# Patient Record
Sex: FEMALE | Race: ASIAN | NOT HISPANIC OR LATINO | Employment: UNEMPLOYED | URBAN - METROPOLITAN AREA
[De-identification: names, ages, dates, MRNs, and addresses within clinical notes are randomized per-mention and may not be internally consistent; named-entity substitution may affect disease eponyms.]

---
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
## 2025-01-07 ENCOUNTER — PRENATAL OFFICE VISIT (OUTPATIENT)
Dept: FAMILY MEDICINE | Facility: CLINIC | Age: 20
End: 2025-01-07
Payer: COMMERCIAL

## 2025-01-07 VITALS
RESPIRATION RATE: 22 BRPM | BODY MASS INDEX: 31.93 KG/M2 | OXYGEN SATURATION: 100 % | HEIGHT: 57 IN | SYSTOLIC BLOOD PRESSURE: 127 MMHG | DIASTOLIC BLOOD PRESSURE: 84 MMHG | TEMPERATURE: 97.7 F | HEART RATE: 105 BPM | WEIGHT: 148 LBS

## 2025-01-07 DIAGNOSIS — I10 CHRONIC HYPERTENSION: ICD-10-CM

## 2025-01-07 DIAGNOSIS — O09.90 SUPERVISION OF HIGH RISK PREGNANCY, ANTEPARTUM: Primary | ICD-10-CM

## 2025-01-07 PROCEDURE — 99207 PR PRENATAL VISIT: CPT | Performed by: FAMILY MEDICINE

## 2025-01-07 PROCEDURE — 91320 SARSCV2 VAC 30MCG TRS-SUC IM: CPT | Performed by: FAMILY MEDICINE

## 2025-01-07 PROCEDURE — 90480 ADMN SARSCOV2 VAC 1/ONLY CMP: CPT | Performed by: FAMILY MEDICINE

## 2025-01-07 PROCEDURE — 81003 URINALYSIS AUTO W/O SCOPE: CPT | Performed by: FAMILY MEDICINE

## 2025-01-07 NOTE — PROGRESS NOTES
Feeling well. Taking her medication as directed.   No dizziness or chest pain. No leg swelling.   No bleeding, pelvic pain.   Needs fetal survey. [facility] referral placed for chronic hypertension, fetal survey.   Covid vaccine today.

## 2025-01-08 ENCOUNTER — TRANSCRIBE ORDERS (OUTPATIENT)
Dept: MATERNAL FETAL MEDICINE | Facility: HOSPITAL | Age: 20
End: 2025-01-08
Payer: COMMERCIAL

## 2025-01-08 DIAGNOSIS — O26.90 PREGNANCY RELATED CONDITION, ANTEPARTUM: Primary | ICD-10-CM

## 2025-01-16 ENCOUNTER — PRE VISIT (OUTPATIENT)
Dept: MATERNAL FETAL MEDICINE | Facility: HOSPITAL | Age: 20
End: 2025-01-16
Payer: COMMERCIAL

## 2025-01-20 ENCOUNTER — ANCILLARY PROCEDURE (OUTPATIENT)
Dept: ULTRASOUND IMAGING | Facility: HOSPITAL | Age: 20
End: 2025-01-20
Attending: STUDENT IN AN ORGANIZED HEALTH CARE EDUCATION/TRAINING PROGRAM
Payer: COMMERCIAL

## 2025-01-20 ENCOUNTER — OFFICE VISIT (OUTPATIENT)
Dept: MATERNAL FETAL MEDICINE | Facility: HOSPITAL | Age: 20
End: 2025-01-20
Attending: STUDENT IN AN ORGANIZED HEALTH CARE EDUCATION/TRAINING PROGRAM
Payer: COMMERCIAL

## 2025-01-20 DIAGNOSIS — O35.EXX0 PYELECTASIS OF FETUS ON PRENATAL ULTRASOUND: Primary | ICD-10-CM

## 2025-01-20 DIAGNOSIS — O10.919 CHRONIC HYPERTENSION IN PREGNANCY: ICD-10-CM

## 2025-01-20 DIAGNOSIS — O26.90 PREGNANCY RELATED CONDITION, ANTEPARTUM: ICD-10-CM

## 2025-01-20 DIAGNOSIS — O99.210 OBESITY DURING PREGNANCY: ICD-10-CM

## 2025-01-20 PROCEDURE — 76811 OB US DETAILED SNGL FETUS: CPT | Mod: 26 | Performed by: STUDENT IN AN ORGANIZED HEALTH CARE EDUCATION/TRAINING PROGRAM

## 2025-01-20 PROCEDURE — 99213 OFFICE O/P EST LOW 20 MIN: CPT | Mod: 25 | Performed by: STUDENT IN AN ORGANIZED HEALTH CARE EDUCATION/TRAINING PROGRAM

## 2025-01-20 PROCEDURE — 76811 OB US DETAILED SNGL FETUS: CPT

## 2025-01-20 NOTE — PROGRESS NOTES
The patient was seen for an ultrasound in the [facility] today.  For a detailed report of the ultrasound examination, please see the ultrasound report which can be found under the imaging tab.    If you have questions regarding today's evaluation or if we can be of further service, please contact the [facility].    Katherine Marinelli MD  , OB/GYN  Maternal-Fetal Medicine

## 2025-01-20 NOTE — NURSING NOTE
Ashley Cross is a  at 24w4d who presents to [facility] for a comprehensive ultrasound. Pt reports positive fetal movement. Pt denies bldg/lof/change in discharge, contractions, headache, vision changes, chest pain/SOB or edema. SBAR given to Dr. Marinelli, see note in Epic.      Bhakti Kate RN

## 2025-02-05 ENCOUNTER — PRENATAL OFFICE VISIT (OUTPATIENT)
Dept: FAMILY MEDICINE | Facility: CLINIC | Age: 20
End: 2025-02-05
Payer: COMMERCIAL

## 2025-02-05 VITALS
SYSTOLIC BLOOD PRESSURE: 117 MMHG | WEIGHT: 154 LBS | DIASTOLIC BLOOD PRESSURE: 82 MMHG | OXYGEN SATURATION: 100 % | HEART RATE: 120 BPM | RESPIRATION RATE: 20 BRPM | BODY MASS INDEX: 33.22 KG/M2 | HEIGHT: 57 IN | TEMPERATURE: 97.8 F

## 2025-02-05 DIAGNOSIS — O09.90 SUPERVISION OF HIGH RISK PREGNANCY, ANTEPARTUM: Primary | ICD-10-CM

## 2025-02-05 DIAGNOSIS — I10 CHRONIC HYPERTENSION: ICD-10-CM

## 2025-02-05 LAB
ALBUMIN UR-MCNC: NEGATIVE MG/DL
GLUCOSE 1H P 50 G GLC PO SERPL-MCNC: 138 MG/DL (ref 70–129)
GLUCOSE UR STRIP-MCNC: NEGATIVE MG/DL
HGB BLD-MCNC: 11.8 G/DL (ref 11.7–15.7)
KETONES UR STRIP-MCNC: ABNORMAL MG/DL
T PALLIDUM AB SER QL: NONREACTIVE

## 2025-02-05 PROCEDURE — 81003 URINALYSIS AUTO W/O SCOPE: CPT | Performed by: FAMILY MEDICINE

## 2025-02-05 PROCEDURE — 99207 PR PRENATAL VISIT: CPT | Performed by: FAMILY MEDICINE

## 2025-02-05 PROCEDURE — 36415 COLL VENOUS BLD VENIPUNCTURE: CPT | Performed by: FAMILY MEDICINE

## 2025-02-05 PROCEDURE — 86780 TREPONEMA PALLIDUM: CPT | Performed by: FAMILY MEDICINE

## 2025-02-05 PROCEDURE — 82950 GLUCOSE TEST: CPT | Performed by: FAMILY MEDICINE

## 2025-02-05 NOTE — PROGRESS NOTES
Feeling well. Reports regular FM.   No ctx, lof, bleeding.   One hour today.   Follow up visits reviewed.

## 2025-02-06 ENCOUNTER — TELEPHONE (OUTPATIENT)
Dept: FAMILY MEDICINE | Facility: CLINIC | Age: 20
End: 2025-02-06
Payer: COMMERCIAL

## 2025-02-06 NOTE — TELEPHONE ENCOUNTER
----- Message from Linda Maldonado sent at 2/5/2025  4:06 PM CST -----  Please call. Sorry the glucose was a little high. Needs the 3 hour test. Can schedule some time soon, fasting lab only visit please.

## 2025-02-10 ENCOUNTER — LAB (OUTPATIENT)
Dept: LAB | Facility: CLINIC | Age: 20
End: 2025-02-10
Payer: COMMERCIAL

## 2025-02-10 DIAGNOSIS — I10 CHRONIC HYPERTENSION: ICD-10-CM

## 2025-02-10 DIAGNOSIS — O09.90 SUPERVISION OF HIGH RISK PREGNANCY, ANTEPARTUM: ICD-10-CM

## 2025-02-10 LAB
GESTATIONAL GTT 1 HR POST DOSE: 138 MG/DL (ref 60–179)
GESTATIONAL GTT 2 HR POST DOSE: 111 MG/DL (ref 60–154)
GESTATIONAL GTT 3 HR POST DOSE: 98 MG/DL (ref 60–139)
GLUCOSE P FAST SERPL-MCNC: 75 MG/DL (ref 60–94)

## 2025-02-10 PROCEDURE — 82951 GLUCOSE TOLERANCE TEST (GTT): CPT

## 2025-02-10 PROCEDURE — 36415 COLL VENOUS BLD VENIPUNCTURE: CPT

## 2025-02-10 PROCEDURE — 82952 GTT-ADDED SAMPLES: CPT

## 2025-02-11 ENCOUNTER — TELEPHONE (OUTPATIENT)
Dept: FAMILY MEDICINE | Facility: CLINIC | Age: 20
End: 2025-02-11
Payer: COMMERCIAL

## 2025-02-11 NOTE — TELEPHONE ENCOUNTER
----- Message from Linda Maldonado sent at 2/11/2025  9:02 AM CST -----  Please call. She passed her glucose tests, no diabetes!

## 2025-03-05 ENCOUNTER — PRENATAL OFFICE VISIT (OUTPATIENT)
Dept: FAMILY MEDICINE | Facility: CLINIC | Age: 20
End: 2025-03-05
Payer: COMMERCIAL

## 2025-03-05 VITALS
TEMPERATURE: 97 F | OXYGEN SATURATION: 99 % | SYSTOLIC BLOOD PRESSURE: 126 MMHG | HEART RATE: 127 BPM | WEIGHT: 153 LBS | HEIGHT: 57 IN | RESPIRATION RATE: 22 BRPM | BODY MASS INDEX: 33.01 KG/M2 | DIASTOLIC BLOOD PRESSURE: 64 MMHG

## 2025-03-05 DIAGNOSIS — O09.90 SUPERVISION OF HIGH RISK PREGNANCY, ANTEPARTUM: Primary | ICD-10-CM

## 2025-03-05 DIAGNOSIS — I10 CHRONIC HYPERTENSION: ICD-10-CM

## 2025-03-05 LAB
ALBUMIN UR-MCNC: 30 MG/DL
GLUCOSE UR STRIP-MCNC: NEGATIVE MG/DL
KETONES UR STRIP-MCNC: NEGATIVE MG/DL

## 2025-03-05 PROCEDURE — 0502F SUBSEQUENT PRENATAL CARE: CPT | Performed by: FAMILY MEDICINE

## 2025-03-05 PROCEDURE — 99207 PR PRENATAL VISIT: CPT | Performed by: FAMILY MEDICINE

## 2025-03-05 PROCEDURE — 3074F SYST BP LT 130 MM HG: CPT | Performed by: FAMILY MEDICINE

## 2025-03-05 PROCEDURE — 3078F DIAST BP <80 MM HG: CPT | Performed by: FAMILY MEDICINE

## 2025-03-05 PROCEDURE — 81003 URINALYSIS AUTO W/O SCOPE: CPT | Performed by: FAMILY MEDICINE

## 2025-03-05 NOTE — PROGRESS NOTES
No ctx, lof, bleeding.+FM.   No new concerns.   Follow up planned with MFM.   Will do tdap next visit.

## 2025-03-18 ENCOUNTER — OFFICE VISIT (OUTPATIENT)
Dept: MATERNAL FETAL MEDICINE | Facility: HOSPITAL | Age: 20
End: 2025-03-18
Attending: OBSTETRICS & GYNECOLOGY
Payer: COMMERCIAL

## 2025-03-18 ENCOUNTER — ANCILLARY PROCEDURE (OUTPATIENT)
Dept: ULTRASOUND IMAGING | Facility: HOSPITAL | Age: 20
End: 2025-03-18
Attending: OBSTETRICS & GYNECOLOGY
Payer: COMMERCIAL

## 2025-03-18 DIAGNOSIS — O35.EXX0 ENCOUNTER FOR REPEAT ULTRASOUND OF FETAL PYELECTASIS, ANTEPARTUM, SINGLE OR UNSPECIFIED FETUS: ICD-10-CM

## 2025-03-18 DIAGNOSIS — O35.EXX0 PYELECTASIS OF FETUS ON PRENATAL ULTRASOUND: ICD-10-CM

## 2025-03-18 DIAGNOSIS — O10.919 CHRONIC HYPERTENSION IN PREGNANCY: Primary | ICD-10-CM

## 2025-03-18 PROCEDURE — 76816 OB US FOLLOW-UP PER FETUS: CPT

## 2025-03-18 PROCEDURE — 99213 OFFICE O/P EST LOW 20 MIN: CPT | Mod: 25 | Performed by: OBSTETRICS & GYNECOLOGY

## 2025-03-18 PROCEDURE — 76816 OB US FOLLOW-UP PER FETUS: CPT | Mod: 26 | Performed by: OBSTETRICS & GYNECOLOGY

## 2025-03-18 PROCEDURE — 76819 FETAL BIOPHYS PROFIL W/O NST: CPT

## 2025-03-18 PROCEDURE — 76819 FETAL BIOPHYS PROFIL W/O NST: CPT | Mod: 26 | Performed by: OBSTETRICS & GYNECOLOGY

## 2025-03-18 NOTE — NURSING NOTE
Patient reports positive fetal movement, denies pain, leaking of fluid, or bleeding. Education provided to patient on today's ultrasound.  SBAR given to MAJO PASCAL, see their note in Epic.

## 2025-03-18 NOTE — PROGRESS NOTES
"Please see \"Imaging\" tab under \"Chart Review\" for details of today's visit.    Carlos Cherry    " Abnormal Test Results     Name: Keila    Ordering Provider: Johnathon Medellin    Last Office Visit with Provider: 1/3/2020     Test Name: comprehensive metabolic panel    Test Date: 1/3/2020 @ 1510    Test Value: 2.7 potassium     Test Normal Range: 3.4-5.1      -------------------------------------------------------  2 attempts made to reach patient. No answer and unable to leave a voice message. Per Dr. Lopez Drake, patient should have a repeat BMP drawn tomorrow at a walk in clinic. RN to put order in at this time. If patient does not answer the phone call tonight, please call back in the am to make patient aware. RN to route to call folder to address in the morning.    Reason for Disposition  • Lab or radiology calling with test results    Protocols used: PCP CALL - NO TRIAGE-A-

## 2025-03-25 ENCOUNTER — ANCILLARY PROCEDURE (OUTPATIENT)
Dept: ULTRASOUND IMAGING | Facility: HOSPITAL | Age: 20
End: 2025-03-25
Attending: STUDENT IN AN ORGANIZED HEALTH CARE EDUCATION/TRAINING PROGRAM
Payer: COMMERCIAL

## 2025-03-25 ENCOUNTER — OFFICE VISIT (OUTPATIENT)
Dept: MATERNAL FETAL MEDICINE | Facility: HOSPITAL | Age: 20
End: 2025-03-25
Attending: STUDENT IN AN ORGANIZED HEALTH CARE EDUCATION/TRAINING PROGRAM
Payer: COMMERCIAL

## 2025-03-25 DIAGNOSIS — O10.919 CHRONIC HYPERTENSION IN PREGNANCY: Primary | ICD-10-CM

## 2025-03-25 DIAGNOSIS — O99.210 OBESITY DURING PREGNANCY: ICD-10-CM

## 2025-03-25 DIAGNOSIS — O10.919 CHRONIC HYPERTENSION IN PREGNANCY: ICD-10-CM

## 2025-03-25 PROCEDURE — 99207 PR NO CHARGE LOS: CPT | Performed by: STUDENT IN AN ORGANIZED HEALTH CARE EDUCATION/TRAINING PROGRAM

## 2025-03-25 PROCEDURE — 76819 FETAL BIOPHYS PROFIL W/O NST: CPT

## 2025-03-25 NOTE — PROGRESS NOTES
The patient was seen for an ultrasound in the [facility] today.  For a detailed report of the ultrasound examination, please see the ultrasound report which can be found under the imaging tab.     If you have questions regarding today's evaluation or if we can be of further service, please contact the [facility].    Lorie Pearce MD  Maternal Fetal Medicine

## 2025-03-25 NOTE — NURSING NOTE
Ashley ERIN Cross is a  at 33w5d who presents to [facility] for scheduled BPP. Pt reports positive fetal movement. Pt denies bldg/lof/change in discharge, contractions, headache, vision changes, chest pain/SOB or edema. SBAR given to Dr. ERIN Pearce, see note in Epic.

## 2025-04-01 ENCOUNTER — OFFICE VISIT (OUTPATIENT)
Dept: MATERNAL FETAL MEDICINE | Facility: HOSPITAL | Age: 20
End: 2025-04-01
Attending: STUDENT IN AN ORGANIZED HEALTH CARE EDUCATION/TRAINING PROGRAM
Payer: COMMERCIAL

## 2025-04-01 ENCOUNTER — ANCILLARY PROCEDURE (OUTPATIENT)
Dept: ULTRASOUND IMAGING | Facility: HOSPITAL | Age: 20
End: 2025-04-01
Attending: STUDENT IN AN ORGANIZED HEALTH CARE EDUCATION/TRAINING PROGRAM
Payer: COMMERCIAL

## 2025-04-01 DIAGNOSIS — O10.919 CHRONIC HYPERTENSION AFFECTING PREGNANCY: Primary | ICD-10-CM

## 2025-04-01 DIAGNOSIS — O10.919 CHRONIC HYPERTENSION IN PREGNANCY: ICD-10-CM

## 2025-04-01 PROCEDURE — 99207 PR NO CHARGE LOS: CPT | Performed by: STUDENT IN AN ORGANIZED HEALTH CARE EDUCATION/TRAINING PROGRAM

## 2025-04-01 PROCEDURE — 76819 FETAL BIOPHYS PROFIL W/O NST: CPT | Mod: 26 | Performed by: STUDENT IN AN ORGANIZED HEALTH CARE EDUCATION/TRAINING PROGRAM

## 2025-04-01 PROCEDURE — 76819 FETAL BIOPHYS PROFIL W/O NST: CPT

## 2025-04-01 NOTE — NURSING NOTE
Tomdisha Cross is a  at 34w5d who presents to [facility] for BPP for CHTN on labetalol. Pt reports positive fetal movement. Pt denies bldg/lof/change in discharge, contractions, headache, vision changes, chest pain/SOB or edema. SBAR given to Dr. ERIN Pearce, see note in Epic.

## 2025-04-01 NOTE — PROGRESS NOTES
The patient was seen for an ultrasound in the [facility] today.  For a detailed report of the ultrasound examination, please see the ultrasound report which can be found under the imaging tab.     If you have questions regarding today's evaluation or if we can be of further service, please contact the [facility] Center.    Lorie Pearce MD  Maternal Fetal Medicine

## 2025-04-11 ENCOUNTER — ANCILLARY PROCEDURE (OUTPATIENT)
Dept: ULTRASOUND IMAGING | Facility: HOSPITAL | Age: 20
End: 2025-04-11
Attending: STUDENT IN AN ORGANIZED HEALTH CARE EDUCATION/TRAINING PROGRAM
Payer: COMMERCIAL

## 2025-04-11 DIAGNOSIS — O10.919 CHRONIC HYPERTENSION IN PREGNANCY: ICD-10-CM

## 2025-04-11 PROCEDURE — 76819 FETAL BIOPHYS PROFIL W/O NST: CPT

## 2025-04-11 PROCEDURE — 76819 FETAL BIOPHYS PROFIL W/O NST: CPT | Mod: 26 | Performed by: STUDENT IN AN ORGANIZED HEALTH CARE EDUCATION/TRAINING PROGRAM

## 2025-04-15 ENCOUNTER — ANCILLARY PROCEDURE (OUTPATIENT)
Dept: ULTRASOUND IMAGING | Facility: HOSPITAL | Age: 20
End: 2025-04-15
Attending: OBSTETRICS & GYNECOLOGY
Payer: COMMERCIAL

## 2025-04-15 ENCOUNTER — OFFICE VISIT (OUTPATIENT)
Dept: MATERNAL FETAL MEDICINE | Facility: HOSPITAL | Age: 20
End: 2025-04-15
Attending: OBSTETRICS & GYNECOLOGY
Payer: COMMERCIAL

## 2025-04-15 DIAGNOSIS — O35.EXX0 PYELECTASIS OF FETUS ON PRENATAL ULTRASOUND: Primary | ICD-10-CM

## 2025-04-15 DIAGNOSIS — O10.919 CHRONIC HYPERTENSION AFFECTING PREGNANCY: ICD-10-CM

## 2025-04-15 DIAGNOSIS — O10.919 CHRONIC HYPERTENSION IN PREGNANCY: ICD-10-CM

## 2025-04-15 PROCEDURE — 76816 OB US FOLLOW-UP PER FETUS: CPT | Mod: 26 | Performed by: OBSTETRICS & GYNECOLOGY

## 2025-04-15 PROCEDURE — 99213 OFFICE O/P EST LOW 20 MIN: CPT | Mod: 25 | Performed by: OBSTETRICS & GYNECOLOGY

## 2025-04-15 PROCEDURE — 76819 FETAL BIOPHYS PROFIL W/O NST: CPT | Mod: 26 | Performed by: OBSTETRICS & GYNECOLOGY

## 2025-04-15 PROCEDURE — 76819 FETAL BIOPHYS PROFIL W/O NST: CPT

## 2025-04-15 PROCEDURE — G0463 HOSPITAL OUTPT CLINIC VISIT: HCPCS | Performed by: OBSTETRICS & GYNECOLOGY

## 2025-04-15 NOTE — PROGRESS NOTES
Please see the imaging tab for details of the ultrasound performed today.    Nayla Squires MD  Specialist in Maternal-Fetal Medicine

## 2025-04-15 NOTE — NURSING NOTE
Ashley Cross is a  at 36w5d who presents to [facility] for a follow-up growth and BPP. Pt reports positive fetal movement. Pt denies bldg/lof/change in discharge, contractions, headache, vision changes, chest pain/SOB or edema. SBAR given to Dr. Squires, see note in Epic.      Pediatric urology sent for bilateral UTD A1.      Bhakti Kate RN

## 2025-04-16 ENCOUNTER — TELEPHONE (OUTPATIENT)
Dept: UROLOGY | Facility: CLINIC | Age: 20
End: 2025-04-16

## 2025-04-16 ENCOUNTER — PRENATAL OFFICE VISIT (OUTPATIENT)
Dept: FAMILY MEDICINE | Facility: CLINIC | Age: 20
End: 2025-04-16
Payer: COMMERCIAL

## 2025-04-16 VITALS
BODY MASS INDEX: 34 KG/M2 | DIASTOLIC BLOOD PRESSURE: 70 MMHG | SYSTOLIC BLOOD PRESSURE: 110 MMHG | HEART RATE: 95 BPM | TEMPERATURE: 98.1 F | HEIGHT: 58 IN | WEIGHT: 162 LBS | RESPIRATION RATE: 16 BRPM | OXYGEN SATURATION: 100 %

## 2025-04-16 DIAGNOSIS — O09.90 SUPERVISION OF HIGH RISK PREGNANCY, ANTEPARTUM: Primary | ICD-10-CM

## 2025-04-16 DIAGNOSIS — I10 CHRONIC HYPERTENSION: ICD-10-CM

## 2025-04-16 PROCEDURE — 87653 STREP B DNA AMP PROBE: CPT | Performed by: FAMILY MEDICINE

## 2025-04-16 RX ORDER — LABETALOL 200 MG/1
200 TABLET, FILM COATED ORAL 2 TIMES DAILY
Qty: 60 TABLET | Refills: 2 | Status: SHIPPED | OUTPATIENT
Start: 2025-04-16

## 2025-04-17 LAB — GP B STREP DNA SPEC QL NAA+PROBE: NEGATIVE

## 2025-04-18 PROBLEM — O35.EXX0 FETAL RENAL ANOMALY, SINGLE GESTATION: Status: ACTIVE | Noted: 2025-04-18

## 2025-04-22 ENCOUNTER — ANCILLARY PROCEDURE (OUTPATIENT)
Dept: ULTRASOUND IMAGING | Facility: HOSPITAL | Age: 20
End: 2025-04-22
Attending: STUDENT IN AN ORGANIZED HEALTH CARE EDUCATION/TRAINING PROGRAM
Payer: COMMERCIAL

## 2025-04-22 ENCOUNTER — OFFICE VISIT (OUTPATIENT)
Dept: MATERNAL FETAL MEDICINE | Facility: HOSPITAL | Age: 20
End: 2025-04-22
Attending: STUDENT IN AN ORGANIZED HEALTH CARE EDUCATION/TRAINING PROGRAM
Payer: COMMERCIAL

## 2025-04-22 DIAGNOSIS — O35.EXX0 PYELECTASIS OF FETUS ON PRENATAL ULTRASOUND: Primary | ICD-10-CM

## 2025-04-22 DIAGNOSIS — O10.919 CHRONIC HYPERTENSION AFFECTING PREGNANCY: ICD-10-CM

## 2025-04-22 PROCEDURE — 76819 FETAL BIOPHYS PROFIL W/O NST: CPT | Mod: 26 | Performed by: STUDENT IN AN ORGANIZED HEALTH CARE EDUCATION/TRAINING PROGRAM

## 2025-04-22 PROCEDURE — 76819 FETAL BIOPHYS PROFIL W/O NST: CPT

## 2025-04-22 PROCEDURE — 76816 OB US FOLLOW-UP PER FETUS: CPT | Mod: 26 | Performed by: STUDENT IN AN ORGANIZED HEALTH CARE EDUCATION/TRAINING PROGRAM

## 2025-04-22 PROCEDURE — 99213 OFFICE O/P EST LOW 20 MIN: CPT | Mod: 25 | Performed by: STUDENT IN AN ORGANIZED HEALTH CARE EDUCATION/TRAINING PROGRAM

## 2025-04-22 NOTE — NURSING NOTE
Patient reports positive fetal movement, denies pain, denies contractions/pre-term labor, leaking of fluid, or bleeding. Patient denies headache, visual changes, nausea/vomiting, epigastric pain related to preeclampsia. Education provided to patient on RL2/BPP. SBAR given to MAJO PASCAL, see their note in Epic.    Emily Fowler RN

## 2025-04-22 NOTE — PROGRESS NOTES
"Please see \"Imaging\" tab under \"Chart Review\" for details of today's visit.    Madeleine Pearce    "

## 2025-04-23 ENCOUNTER — PRENATAL OFFICE VISIT (OUTPATIENT)
Dept: FAMILY MEDICINE | Facility: CLINIC | Age: 20
End: 2025-04-23
Payer: COMMERCIAL

## 2025-04-23 VITALS
TEMPERATURE: 97.5 F | WEIGHT: 164 LBS | RESPIRATION RATE: 20 BRPM | HEART RATE: 99 BPM | OXYGEN SATURATION: 98 % | BODY MASS INDEX: 34.43 KG/M2 | SYSTOLIC BLOOD PRESSURE: 122 MMHG | DIASTOLIC BLOOD PRESSURE: 87 MMHG | HEIGHT: 58 IN

## 2025-04-23 DIAGNOSIS — I10 CHRONIC HYPERTENSION: ICD-10-CM

## 2025-04-23 DIAGNOSIS — O35.EXX0 FETAL RENAL ANOMALY, SINGLE GESTATION: ICD-10-CM

## 2025-04-23 DIAGNOSIS — O09.90 SUPERVISION OF HIGH RISK PREGNANCY, ANTEPARTUM: Primary | ICD-10-CM

## 2025-04-23 PROCEDURE — 0502F SUBSEQUENT PRENATAL CARE: CPT | Performed by: FAMILY MEDICINE

## 2025-04-23 PROCEDURE — 81003 URINALYSIS AUTO W/O SCOPE: CPT | Performed by: FAMILY MEDICINE

## 2025-04-23 PROCEDURE — 3074F SYST BP LT 130 MM HG: CPT | Performed by: FAMILY MEDICINE

## 2025-04-23 PROCEDURE — 99207 PR PRENATAL VISIT: CPT | Performed by: FAMILY MEDICINE

## 2025-04-23 PROCEDURE — 3079F DIAST BP 80-89 MM HG: CPT | Performed by: FAMILY MEDICINE

## 2025-04-23 NOTE — PROGRESS NOTES
Feeling well. No ctx, lof, bleeding. +FM.   We discussed her recent imaging, good blood pressure control, fetal renal diagnosis, timing of induction of labor. Recommended early next week. She prefers to wait until her due date.   CE as above. Fundal height as above. Will monitor closely and encourage induction of labor next week.

## 2025-04-29 ENCOUNTER — HOSPITAL ENCOUNTER (INPATIENT)
Facility: HOSPITAL | Age: 20
End: 2025-04-29
Attending: FAMILY MEDICINE | Admitting: FAMILY MEDICINE
Payer: COMMERCIAL

## 2025-04-29 ENCOUNTER — OFFICE VISIT (OUTPATIENT)
Dept: INTERPRETER SERVICES | Facility: CLINIC | Age: 20
End: 2025-04-29

## 2025-04-29 DIAGNOSIS — Z98.891 S/P PRIMARY LOW TRANSVERSE C-SECTION: ICD-10-CM

## 2025-04-29 DIAGNOSIS — Z36.89 ENCOUNTER FOR TRIAGE IN PREGNANT PATIENT: Primary | ICD-10-CM

## 2025-04-29 PROBLEM — O10.919 CHRONIC HYPERTENSION AFFECTING PREGNANCY: Status: ACTIVE | Noted: 2025-04-29

## 2025-04-29 PROBLEM — O42.90 DELAYED DELIVERY AFTER SROM (SPONTANEOUS RUPTURE OF MEMBRANES): Status: ACTIVE | Noted: 2025-04-29

## 2025-04-29 PROBLEM — O09.90 SUPERVISION OF HIGH RISK PREGNANCY, ANTEPARTUM: Status: ACTIVE | Noted: 2025-04-29

## 2025-04-29 PROBLEM — I10 CHRONIC HYPERTENSION: Status: ACTIVE | Noted: 2024-12-04

## 2025-04-29 LAB
ABO + RH BLD: NORMAL
ALBUMIN MFR UR ELPH: 29.8 MG/DL
ALBUMIN SERPL BCG-MCNC: 3.9 G/DL (ref 3.5–5.2)
ALP SERPL-CCNC: 240 U/L (ref 40–150)
ALT SERPL W P-5'-P-CCNC: 15 U/L (ref 0–50)
ANION GAP SERPL CALCULATED.3IONS-SCNC: 9 MMOL/L (ref 7–15)
AST SERPL W P-5'-P-CCNC: 17 U/L (ref 0–35)
BILIRUB SERPL-MCNC: 0.4 MG/DL
BLD GP AB SCN SERPL QL: NEGATIVE
BUN SERPL-MCNC: 7.9 MG/DL (ref 6–20)
CALCIUM SERPL-MCNC: 9.4 MG/DL (ref 8.8–10.4)
CHLORIDE SERPL-SCNC: 105 MMOL/L (ref 98–107)
CREAT SERPL-MCNC: 0.73 MG/DL (ref 0.51–0.95)
CREAT UR-MCNC: 111.2 MG/DL
EGFRCR SERPLBLD CKD-EPI 2021: >90 ML/MIN/1.73M2
ERYTHROCYTE [DISTWIDTH] IN BLOOD BY AUTOMATED COUNT: 15.4 % (ref 10–15)
GLUCOSE SERPL-MCNC: 73 MG/DL (ref 70–99)
HCO3 SERPL-SCNC: 22 MMOL/L (ref 22–29)
HCT VFR BLD AUTO: 41.5 % (ref 35–47)
HGB BLD-MCNC: 13.8 G/DL (ref 11.7–15.7)
MCH RBC QN AUTO: 26.7 PG (ref 26.5–33)
MCHC RBC AUTO-ENTMCNC: 33.3 G/DL (ref 31.5–36.5)
MCV RBC AUTO: 80 FL (ref 78–100)
PLATELET # BLD AUTO: 344 10E3/UL (ref 150–450)
POTASSIUM SERPL-SCNC: 4 MMOL/L (ref 3.4–5.3)
PROT SERPL-MCNC: 7.9 G/DL (ref 6.4–8.3)
PROT/CREAT 24H UR: 0.27 MG/MG CR (ref 0–0.2)
RBC # BLD AUTO: 5.16 10E6/UL (ref 3.8–5.2)
SODIUM SERPL-SCNC: 136 MMOL/L (ref 135–145)
SPECIMEN EXP DATE BLD: NORMAL
T PALLIDUM AB SER QL: NONREACTIVE
WBC # BLD AUTO: 14 10E3/UL (ref 4–11)

## 2025-04-29 PROCEDURE — 99222 1ST HOSP IP/OBS MODERATE 55: CPT | Performed by: FAMILY MEDICINE

## 2025-04-29 PROCEDURE — 86780 TREPONEMA PALLIDUM: CPT | Performed by: FAMILY MEDICINE

## 2025-04-29 PROCEDURE — 86901 BLOOD TYPING SEROLOGIC RH(D): CPT | Performed by: FAMILY MEDICINE

## 2025-04-29 PROCEDURE — 85018 HEMOGLOBIN: CPT | Performed by: FAMILY MEDICINE

## 2025-04-29 PROCEDURE — 250N000013 HC RX MED GY IP 250 OP 250 PS 637: Performed by: FAMILY MEDICINE

## 2025-04-29 PROCEDURE — 120N000001 HC R&B MED SURG/OB

## 2025-04-29 PROCEDURE — 84156 ASSAY OF PROTEIN URINE: CPT | Performed by: FAMILY MEDICINE

## 2025-04-29 PROCEDURE — T1013 SIGN LANG/ORAL INTERPRETER: HCPCS

## 2025-04-29 PROCEDURE — 36415 COLL VENOUS BLD VENIPUNCTURE: CPT | Performed by: FAMILY MEDICINE

## 2025-04-29 PROCEDURE — 82947 ASSAY GLUCOSE BLOOD QUANT: CPT | Performed by: FAMILY MEDICINE

## 2025-04-29 RX ORDER — OXYTOCIN 10 [USP'U]/ML
10 INJECTION, SOLUTION INTRAMUSCULAR; INTRAVENOUS
Status: DISCONTINUED | OUTPATIENT
Start: 2025-04-29 | End: 2025-05-02 | Stop reason: HOSPADM

## 2025-04-29 RX ORDER — ASPIRIN 81 MG/1
81 TABLET ORAL DAILY
Status: DISCONTINUED | OUTPATIENT
Start: 2025-04-29 | End: 2025-05-01

## 2025-04-29 RX ORDER — MISOPROSTOL 200 UG/1
400 TABLET ORAL
Status: DISCONTINUED | OUTPATIENT
Start: 2025-04-29 | End: 2025-05-02 | Stop reason: HOSPADM

## 2025-04-29 RX ORDER — NALOXONE HYDROCHLORIDE 0.4 MG/ML
0.2 INJECTION, SOLUTION INTRAMUSCULAR; INTRAVENOUS; SUBCUTANEOUS
Status: DISCONTINUED | OUTPATIENT
Start: 2025-04-29 | End: 2025-05-02 | Stop reason: HOSPADM

## 2025-04-29 RX ORDER — SODIUM CHLORIDE, SODIUM LACTATE, POTASSIUM CHLORIDE, CALCIUM CHLORIDE 600; 310; 30; 20 MG/100ML; MG/100ML; MG/100ML; MG/100ML
10-125 INJECTION, SOLUTION INTRAVENOUS CONTINUOUS
Status: DISCONTINUED | OUTPATIENT
Start: 2025-04-29 | End: 2025-05-02 | Stop reason: HOSPADM

## 2025-04-29 RX ORDER — TERBUTALINE SULFATE 1 MG/ML
0.25 INJECTION SUBCUTANEOUS
Status: DISCONTINUED | OUTPATIENT
Start: 2025-04-29 | End: 2025-05-02 | Stop reason: HOSPADM

## 2025-04-29 RX ORDER — OXYTOCIN/0.9 % SODIUM CHLORIDE 30/500 ML
100-340 PLASTIC BAG, INJECTION (ML) INTRAVENOUS CONTINUOUS PRN
Status: DISCONTINUED | OUTPATIENT
Start: 2025-04-29 | End: 2025-05-02 | Stop reason: HOSPADM

## 2025-04-29 RX ORDER — LIDOCAINE 40 MG/G
CREAM TOPICAL
Status: DISCONTINUED | OUTPATIENT
Start: 2025-04-29 | End: 2025-05-02 | Stop reason: HOSPADM

## 2025-04-29 RX ORDER — OXYTOCIN/0.9 % SODIUM CHLORIDE 30/500 ML
340 PLASTIC BAG, INJECTION (ML) INTRAVENOUS CONTINUOUS PRN
Status: DISCONTINUED | OUTPATIENT
Start: 2025-04-29 | End: 2025-05-02 | Stop reason: HOSPADM

## 2025-04-29 RX ORDER — MISOPROSTOL 200 UG/1
800 TABLET ORAL
Status: DISCONTINUED | OUTPATIENT
Start: 2025-04-29 | End: 2025-05-02 | Stop reason: HOSPADM

## 2025-04-29 RX ORDER — FENTANYL CITRATE 50 UG/ML
50 INJECTION, SOLUTION INTRAMUSCULAR; INTRAVENOUS EVERY 30 MIN PRN
Status: DISCONTINUED | OUTPATIENT
Start: 2025-04-29 | End: 2025-05-02 | Stop reason: HOSPADM

## 2025-04-29 RX ORDER — OXYTOCIN/0.9 % SODIUM CHLORIDE 30/500 ML
1-24 PLASTIC BAG, INJECTION (ML) INTRAVENOUS CONTINUOUS
Status: DISCONTINUED | OUTPATIENT
Start: 2025-04-29 | End: 2025-05-02 | Stop reason: HOSPADM

## 2025-04-29 RX ORDER — LOPERAMIDE HYDROCHLORIDE 2 MG/1
2 CAPSULE ORAL
Status: DISCONTINUED | OUTPATIENT
Start: 2025-04-29 | End: 2025-05-02 | Stop reason: HOSPADM

## 2025-04-29 RX ORDER — KETOROLAC TROMETHAMINE 15 MG/ML
15 INJECTION, SOLUTION INTRAMUSCULAR; INTRAVENOUS
Status: DISCONTINUED | OUTPATIENT
Start: 2025-04-29 | End: 2025-05-02 | Stop reason: HOSPADM

## 2025-04-29 RX ORDER — ONDANSETRON 4 MG/1
4 TABLET, ORALLY DISINTEGRATING ORAL EVERY 6 HOURS PRN
Status: DISCONTINUED | OUTPATIENT
Start: 2025-04-29 | End: 2025-05-02 | Stop reason: HOSPADM

## 2025-04-29 RX ORDER — METHYLERGONOVINE MALEATE 0.2 MG/ML
200 INJECTION INTRAVENOUS
Status: DISCONTINUED | OUTPATIENT
Start: 2025-04-29 | End: 2025-05-02 | Stop reason: HOSPADM

## 2025-04-29 RX ORDER — CITRIC ACID/SODIUM CITRATE 334-500MG
30 SOLUTION, ORAL ORAL ONCE
Status: COMPLETED | OUTPATIENT
Start: 2025-04-29 | End: 2025-04-29

## 2025-04-29 RX ORDER — LIDOCAINE 40 MG/G
CREAM TOPICAL
Status: DISCONTINUED | OUTPATIENT
Start: 2025-04-29 | End: 2025-04-29 | Stop reason: HOSPADM

## 2025-04-29 RX ORDER — METOCLOPRAMIDE 10 MG/1
10 TABLET ORAL EVERY 6 HOURS PRN
Status: DISCONTINUED | OUTPATIENT
Start: 2025-04-29 | End: 2025-05-02 | Stop reason: HOSPADM

## 2025-04-29 RX ORDER — LABETALOL 200 MG/1
200 TABLET, FILM COATED ORAL 2 TIMES DAILY
Status: DISCONTINUED | OUTPATIENT
Start: 2025-04-29 | End: 2025-05-02 | Stop reason: HOSPADM

## 2025-04-29 RX ORDER — HYDRALAZINE HYDROCHLORIDE 20 MG/ML
5-10 INJECTION INTRAMUSCULAR; INTRAVENOUS
Status: DISCONTINUED | OUTPATIENT
Start: 2025-04-29 | End: 2025-05-02 | Stop reason: HOSPADM

## 2025-04-29 RX ORDER — LABETALOL HYDROCHLORIDE 5 MG/ML
20-40 INJECTION, SOLUTION INTRAVENOUS EVERY 10 MIN PRN
Status: DISCONTINUED | OUTPATIENT
Start: 2025-04-29 | End: 2025-05-02 | Stop reason: HOSPADM

## 2025-04-29 RX ORDER — METOCLOPRAMIDE HYDROCHLORIDE 5 MG/ML
10 INJECTION INTRAMUSCULAR; INTRAVENOUS EVERY 6 HOURS PRN
Status: DISCONTINUED | OUTPATIENT
Start: 2025-04-29 | End: 2025-05-02 | Stop reason: HOSPADM

## 2025-04-29 RX ORDER — NALOXONE HYDROCHLORIDE 0.4 MG/ML
0.4 INJECTION, SOLUTION INTRAMUSCULAR; INTRAVENOUS; SUBCUTANEOUS
Status: DISCONTINUED | OUTPATIENT
Start: 2025-04-29 | End: 2025-05-02 | Stop reason: HOSPADM

## 2025-04-29 RX ORDER — ONDANSETRON 2 MG/ML
4 INJECTION INTRAMUSCULAR; INTRAVENOUS EVERY 6 HOURS PRN
Status: DISCONTINUED | OUTPATIENT
Start: 2025-04-29 | End: 2025-05-02 | Stop reason: HOSPADM

## 2025-04-29 RX ORDER — TRANEXAMIC ACID 10 MG/ML
1 INJECTION, SOLUTION INTRAVENOUS EVERY 30 MIN PRN
Status: DISCONTINUED | OUTPATIENT
Start: 2025-04-29 | End: 2025-05-02 | Stop reason: HOSPADM

## 2025-04-29 RX ORDER — SODIUM CHLORIDE, SODIUM LACTATE, POTASSIUM CHLORIDE, CALCIUM CHLORIDE 600; 310; 30; 20 MG/100ML; MG/100ML; MG/100ML; MG/100ML
INJECTION, SOLUTION INTRAVENOUS CONTINUOUS PRN
Status: DISCONTINUED | OUTPATIENT
Start: 2025-04-29 | End: 2025-05-02 | Stop reason: HOSPADM

## 2025-04-29 RX ORDER — CARBOPROST TROMETHAMINE 250 UG/ML
250 INJECTION, SOLUTION INTRAMUSCULAR
Status: DISCONTINUED | OUTPATIENT
Start: 2025-04-29 | End: 2025-05-02 | Stop reason: HOSPADM

## 2025-04-29 RX ORDER — CITRIC ACID/SODIUM CITRATE 334-500MG
30 SOLUTION, ORAL ORAL
Status: DISCONTINUED | OUTPATIENT
Start: 2025-04-29 | End: 2025-05-02 | Stop reason: HOSPADM

## 2025-04-29 RX ORDER — IBUPROFEN 800 MG/1
800 TABLET, FILM COATED ORAL
Status: DISCONTINUED | OUTPATIENT
Start: 2025-04-29 | End: 2025-05-02 | Stop reason: HOSPADM

## 2025-04-29 RX ORDER — HYDROXYZINE HYDROCHLORIDE 50 MG/1
100 TABLET, FILM COATED ORAL
Status: COMPLETED | OUTPATIENT
Start: 2025-04-29 | End: 2025-04-29

## 2025-04-29 RX ORDER — PROCHLORPERAZINE MALEATE 10 MG
10 TABLET ORAL EVERY 6 HOURS PRN
Status: DISCONTINUED | OUTPATIENT
Start: 2025-04-29 | End: 2025-05-02 | Stop reason: HOSPADM

## 2025-04-29 RX ORDER — LOPERAMIDE HYDROCHLORIDE 2 MG/1
4 CAPSULE ORAL
Status: DISCONTINUED | OUTPATIENT
Start: 2025-04-29 | End: 2025-05-02 | Stop reason: HOSPADM

## 2025-04-29 RX ADMIN — HYDROXYZINE HYDROCHLORIDE 100 MG: 50 TABLET, FILM COATED ORAL at 22:00

## 2025-04-29 RX ADMIN — LABETALOL HYDROCHLORIDE 200 MG: 200 TABLET, FILM COATED ORAL at 12:29

## 2025-04-29 RX ADMIN — LABETALOL HYDROCHLORIDE 200 MG: 200 TABLET, FILM COATED ORAL at 21:57

## 2025-04-29 RX ADMIN — ASPIRIN 81 MG: 81 TABLET, COATED ORAL at 12:29

## 2025-04-29 ASSESSMENT — ACTIVITIES OF DAILY LIVING (ADL)
ADLS_ACUITY_SCORE: 15

## 2025-04-29 NOTE — PROGRESS NOTES
Dr. Maldonado called and notified of SROM and labor status. Plan of care to recheck cervix at 1445 before change of shift and notify Joel with update.

## 2025-04-29 NOTE — PLAN OF CARE
Problem: Adult Inpatient Plan of Care  Goal: Plan of Care Review  Description: The Plan of Care Review/Shift note should be completed every shift.  The Outcome Evaluation is a brief statement about your assessment that the patient is improving, declining, or no change.  This information will be displayed automatically on your shiftnote.  Outcome: Progressing  Flowsheets (Taken 4/29/2025 3067)  Outcome Evaluation: SVE unchanged. allowing patient to labor independently. VSS. States she's coping with contractions. Intermittent aucsultation. Increases in fetal HR heard. No decreases heard with contractions.  Plan of Care Reviewed With: patient  Overall Patient Progress: no change     Problem: Labor  Goal: Hemostasis  Outcome: Progressing  Goal: Stable Fetal Wellbeing  Outcome: Progressing  Goal: Absence of Infection Signs and Symptoms  Outcome: Progressing  Goal: Acceptable Pain Control  Outcome: Progressing  Goal: Normal Uterine Contraction Pattern  Outcome: Progressing   Goal Outcome Evaluation:      Plan of Care Reviewed With: patient    Overall Patient Progress: no changeOverall Patient Progress: no change    Outcome Evaluation: SVE unchanged. allowing patient to labor independently. VSS. States she's coping with contractions. Intermittent aucsultation. Increases in fetal HR heard. No decreases heard with contractions.

## 2025-04-29 NOTE — LETTER
[facility]  [address] [address]  Phone: [phone]  Fax: [phone]    May 1, 2025        Ashley Cross  [address] [place] [address]          To whom it may concern:    RE: Ashley Ramirez underwent a  delivery on the evening of 2025 and will need to have 8 weeks of medical leave to recover from the procedure.    Please contact me for questions or concerns.      Sincerely,    Belinda Alvarez MD

## 2025-04-29 NOTE — PROGRESS NOTES
Data: [facility]: 2025 10:23 AM.  Reason for maternal/fetal assessment is uterine contractions. Patient reports contractions starting this morning. Patient denies leaking of vaginal fluid/rupture of membranes, vaginal bleeding, abdominal pain, pelvic pressure, nausea, vomiting, headache, visual disturbances, epigastric or RUQ pain, significant edema. Patient reports fetal movement is normal. Patient is a 38w5d . Prenatal record reviewed. Pregnancy has been complicated by hypertension. Support person is present.     Fetal HR baseline was  , variability is  . Accelerations:  . Decelerations:  . Uterine assessment is   during contractions and   at rest. Cervix 3 cm dilated and 80-90% effaced. Fetal station 0. Fetal presentation   per cervical exam. Membranes: intact.    Vital signs wnl. Patient reports pain and is coping.     Action: Verbal consent for EFM. Triage assessment completed. Patient does not meet criteria for early labor discharge.     Response: Patient verbalized agreement with plan. Will contact Dr. Maldonado with update and for further orders.      Dr. Maldonado to place intrapartum orders and hypertension protocol.

## 2025-04-29 NOTE — PROGRESS NOTES
BETTYE performed and unchanged. Dr. Maldonaod on unit and notified. Patient encouraged to either rest or walk around unit to help promote labor. Will discuss when to start pitocin if contractions do not  in frequency or strength. Patient verbalized understanding. Expressed interest in walking around the unit due to feeling more uncomfortable while in bed.

## 2025-04-30 ENCOUNTER — ANESTHESIA EVENT (OUTPATIENT)
Dept: OBGYN | Facility: HOSPITAL | Age: 20
End: 2025-04-30
Payer: COMMERCIAL

## 2025-04-30 ENCOUNTER — PRE VISIT (OUTPATIENT)
Dept: UROLOGY | Facility: CLINIC | Age: 20
End: 2025-04-30

## 2025-04-30 ENCOUNTER — ANESTHESIA (OUTPATIENT)
Dept: OBGYN | Facility: HOSPITAL | Age: 20
End: 2025-04-30
Payer: COMMERCIAL

## 2025-04-30 LAB — HGB BLD-MCNC: 12.5 G/DL (ref 11.7–15.7)

## 2025-04-30 PROCEDURE — 999N000249 HC STATISTIC C-SECTION ON UNIT: Performed by: OBSTETRICS & GYNECOLOGY

## 2025-04-30 PROCEDURE — 710N000010 HC RECOVERY PHASE 1, LEVEL 2, PER MIN: Performed by: OBSTETRICS & GYNECOLOGY

## 2025-04-30 PROCEDURE — 250N000011 HC RX IP 250 OP 636: Performed by: OBSTETRICS & GYNECOLOGY

## 2025-04-30 PROCEDURE — 120N000001 HC R&B MED SURG/OB

## 2025-04-30 PROCEDURE — 250N000011 HC RX IP 250 OP 636: Performed by: GENERAL ACUTE CARE HOSPITAL

## 2025-04-30 PROCEDURE — 250N000009 HC RX 250: Performed by: FAMILY MEDICINE

## 2025-04-30 PROCEDURE — 272N000001 HC OR GENERAL SUPPLY STERILE: Performed by: OBSTETRICS & GYNECOLOGY

## 2025-04-30 PROCEDURE — 258N000003 HC RX IP 258 OP 636: Performed by: OBSTETRICS & GYNECOLOGY

## 2025-04-30 PROCEDURE — 250N000013 HC RX MED GY IP 250 OP 250 PS 637: Performed by: OBSTETRICS & GYNECOLOGY

## 2025-04-30 PROCEDURE — 258N000003 HC RX IP 258 OP 636: Performed by: GENERAL ACUTE CARE HOSPITAL

## 2025-04-30 PROCEDURE — 258N000003 HC RX IP 258 OP 636: Performed by: FAMILY MEDICINE

## 2025-04-30 PROCEDURE — 250N000011 HC RX IP 250 OP 636: Mod: JW | Performed by: ANESTHESIOLOGY

## 2025-04-30 PROCEDURE — 85018 HEMOGLOBIN: CPT | Performed by: OBSTETRICS & GYNECOLOGY

## 2025-04-30 PROCEDURE — 250N000009 HC RX 250: Performed by: OBSTETRICS & GYNECOLOGY

## 2025-04-30 PROCEDURE — 250N000011 HC RX IP 250 OP 636: Performed by: ANESTHESIOLOGY

## 2025-04-30 PROCEDURE — 59514 CESAREAN DELIVERY ONLY: CPT | Mod: 80 | Performed by: FAMILY MEDICINE

## 2025-04-30 PROCEDURE — 10907ZC DRAINAGE OF AMNIOTIC FLUID, THERAPEUTIC FROM PRODUCTS OF CONCEPTION, VIA NATURAL OR ARTIFICIAL OPENING: ICD-10-PCS | Performed by: OBSTETRICS & GYNECOLOGY

## 2025-04-30 PROCEDURE — 999N000248 HC STATISTIC IV INSERT WITH US BY RN

## 2025-04-30 PROCEDURE — 250N000009 HC RX 250: Performed by: GENERAL ACUTE CARE HOSPITAL

## 2025-04-30 PROCEDURE — 250N000011 HC RX IP 250 OP 636: Mod: JZ | Performed by: FAMILY MEDICINE

## 2025-04-30 PROCEDURE — 36415 COLL VENOUS BLD VENIPUNCTURE: CPT | Performed by: OBSTETRICS & GYNECOLOGY

## 2025-04-30 PROCEDURE — 370N000017 HC ANESTHESIA TECHNICAL FEE, PER MIN: Performed by: OBSTETRICS & GYNECOLOGY

## 2025-04-30 PROCEDURE — 360N000076 HC SURGERY LEVEL 3, PER MIN: Performed by: OBSTETRICS & GYNECOLOGY

## 2025-04-30 PROCEDURE — 250N000013 HC RX MED GY IP 250 OP 250 PS 637: Performed by: FAMILY MEDICINE

## 2025-04-30 RX ORDER — FENTANYL CITRATE 50 UG/ML
25 INJECTION, SOLUTION INTRAMUSCULAR; INTRAVENOUS EVERY 5 MIN PRN
Status: DISCONTINUED | OUTPATIENT
Start: 2025-04-30 | End: 2025-04-30 | Stop reason: HOSPADM

## 2025-04-30 RX ORDER — LIDOCAINE HYDROCHLORIDE AND EPINEPHRINE 20; 5 MG/ML; UG/ML
INJECTION, SOLUTION EPIDURAL; INFILTRATION; INTRACAUDAL; PERINEURAL PRN
Status: DISCONTINUED | OUTPATIENT
Start: 2025-04-30 | End: 2025-04-30

## 2025-04-30 RX ORDER — DEXTROSE, SODIUM CHLORIDE, SODIUM LACTATE, POTASSIUM CHLORIDE, AND CALCIUM CHLORIDE 5; .6; .31; .03; .02 G/100ML; G/100ML; G/100ML; G/100ML; G/100ML
INJECTION, SOLUTION INTRAVENOUS CONTINUOUS
Status: DISCONTINUED | OUTPATIENT
Start: 2025-04-30 | End: 2025-05-02 | Stop reason: HOSPADM

## 2025-04-30 RX ORDER — OXYTOCIN/0.9 % SODIUM CHLORIDE 30/500 ML
340 PLASTIC BAG, INJECTION (ML) INTRAVENOUS CONTINUOUS PRN
Status: DISCONTINUED | OUTPATIENT
Start: 2025-04-30 | End: 2025-05-02 | Stop reason: HOSPADM

## 2025-04-30 RX ORDER — OXYCODONE HYDROCHLORIDE 5 MG/1
5 TABLET ORAL EVERY 4 HOURS PRN
Status: DISCONTINUED | OUTPATIENT
Start: 2025-04-30 | End: 2025-05-02 | Stop reason: HOSPADM

## 2025-04-30 RX ORDER — PROCHLORPERAZINE MALEATE 10 MG
10 TABLET ORAL EVERY 6 HOURS PRN
Status: DISCONTINUED | OUTPATIENT
Start: 2025-04-30 | End: 2025-05-02 | Stop reason: HOSPADM

## 2025-04-30 RX ORDER — LOPERAMIDE HYDROCHLORIDE 2 MG/1
4 CAPSULE ORAL
Status: DISCONTINUED | OUTPATIENT
Start: 2025-04-30 | End: 2025-05-02 | Stop reason: HOSPADM

## 2025-04-30 RX ORDER — MISOPROSTOL 200 UG/1
800 TABLET ORAL
Status: DISCONTINUED | OUTPATIENT
Start: 2025-04-30 | End: 2025-05-02 | Stop reason: HOSPADM

## 2025-04-30 RX ORDER — OXYTOCIN/0.9 % SODIUM CHLORIDE 30/500 ML
100-340 PLASTIC BAG, INJECTION (ML) INTRAVENOUS CONTINUOUS PRN
Status: DISCONTINUED | OUTPATIENT
Start: 2025-04-30 | End: 2025-05-02 | Stop reason: HOSPADM

## 2025-04-30 RX ORDER — ONDANSETRON 4 MG/1
4 TABLET, ORALLY DISINTEGRATING ORAL EVERY 30 MIN PRN
Status: DISCONTINUED | OUTPATIENT
Start: 2025-04-30 | End: 2025-04-30 | Stop reason: HOSPADM

## 2025-04-30 RX ORDER — SODIUM PHOSPHATE,MONO-DIBASIC 19G-7G/118
1 ENEMA (ML) RECTAL DAILY PRN
Status: DISCONTINUED | OUTPATIENT
Start: 2025-05-02 | End: 2025-05-02 | Stop reason: HOSPADM

## 2025-04-30 RX ORDER — IBUPROFEN 800 MG/1
800 TABLET, FILM COATED ORAL EVERY 6 HOURS
Status: DISCONTINUED | OUTPATIENT
Start: 2025-05-01 | End: 2025-05-02 | Stop reason: HOSPADM

## 2025-04-30 RX ORDER — LIDOCAINE 40 MG/G
CREAM TOPICAL
Status: DISCONTINUED | OUTPATIENT
Start: 2025-04-30 | End: 2025-05-02 | Stop reason: HOSPADM

## 2025-04-30 RX ORDER — CEFAZOLIN SODIUM/WATER 2 G/20 ML
2 SYRINGE (ML) INTRAVENOUS
Status: COMPLETED | OUTPATIENT
Start: 2025-04-30 | End: 2025-04-30

## 2025-04-30 RX ORDER — FENTANYL CITRATE 50 UG/ML
50 INJECTION, SOLUTION INTRAMUSCULAR; INTRAVENOUS EVERY 5 MIN PRN
Status: DISCONTINUED | OUTPATIENT
Start: 2025-04-30 | End: 2025-04-30 | Stop reason: HOSPADM

## 2025-04-30 RX ORDER — CARBOPROST TROMETHAMINE 250 UG/ML
250 INJECTION, SOLUTION INTRAMUSCULAR
Status: DISCONTINUED | OUTPATIENT
Start: 2025-04-30 | End: 2025-05-02 | Stop reason: HOSPADM

## 2025-04-30 RX ORDER — METHYLERGONOVINE MALEATE 0.2 MG/ML
200 INJECTION INTRAVENOUS
Status: DISCONTINUED | OUTPATIENT
Start: 2025-04-30 | End: 2025-05-02 | Stop reason: HOSPADM

## 2025-04-30 RX ORDER — FENTANYL CITRATE-0.9 % NACL/PF 10 MCG/ML
100 PLASTIC BAG, INJECTION (ML) INTRAVENOUS EVERY 5 MIN PRN
Status: DISCONTINUED | OUTPATIENT
Start: 2025-04-30 | End: 2025-05-02 | Stop reason: HOSPADM

## 2025-04-30 RX ORDER — SODIUM CHLORIDE, SODIUM LACTATE, POTASSIUM CHLORIDE, CALCIUM CHLORIDE 600; 310; 30; 20 MG/100ML; MG/100ML; MG/100ML; MG/100ML
INJECTION, SOLUTION INTRAVENOUS CONTINUOUS PRN
Status: DISCONTINUED | OUTPATIENT
Start: 2025-04-30 | End: 2025-04-30

## 2025-04-30 RX ORDER — HYDROMORPHONE HYDROCHLORIDE 1 MG/ML
0.2 INJECTION, SOLUTION INTRAMUSCULAR; INTRAVENOUS; SUBCUTANEOUS EVERY 5 MIN PRN
Status: DISCONTINUED | OUTPATIENT
Start: 2025-04-30 | End: 2025-04-30 | Stop reason: HOSPADM

## 2025-04-30 RX ORDER — TRANEXAMIC ACID 10 MG/ML
1 INJECTION, SOLUTION INTRAVENOUS ONCE
Status: COMPLETED | OUTPATIENT
Start: 2025-04-30 | End: 2025-04-30

## 2025-04-30 RX ORDER — ACETAMINOPHEN 325 MG/1
975 TABLET ORAL EVERY 6 HOURS
Status: DISCONTINUED | OUTPATIENT
Start: 2025-04-30 | End: 2025-05-02 | Stop reason: HOSPADM

## 2025-04-30 RX ORDER — METOCLOPRAMIDE HYDROCHLORIDE 5 MG/ML
10 INJECTION INTRAMUSCULAR; INTRAVENOUS EVERY 6 HOURS PRN
Status: DISCONTINUED | OUTPATIENT
Start: 2025-04-30 | End: 2025-05-02 | Stop reason: HOSPADM

## 2025-04-30 RX ORDER — BISACODYL 10 MG
10 SUPPOSITORY, RECTAL RECTAL DAILY PRN
Status: DISCONTINUED | OUTPATIENT
Start: 2025-05-02 | End: 2025-05-02 | Stop reason: HOSPADM

## 2025-04-30 RX ORDER — HYDROCORTISONE 25 MG/G
CREAM TOPICAL 3 TIMES DAILY PRN
Status: DISCONTINUED | OUTPATIENT
Start: 2025-04-30 | End: 2025-05-02 | Stop reason: HOSPADM

## 2025-04-30 RX ORDER — KETOROLAC TROMETHAMINE 30 MG/ML
INJECTION, SOLUTION INTRAMUSCULAR; INTRAVENOUS PRN
Status: DISCONTINUED | OUTPATIENT
Start: 2025-04-30 | End: 2025-04-30

## 2025-04-30 RX ORDER — METOCLOPRAMIDE 10 MG/1
10 TABLET ORAL EVERY 6 HOURS PRN
Status: DISCONTINUED | OUTPATIENT
Start: 2025-04-30 | End: 2025-05-02 | Stop reason: HOSPADM

## 2025-04-30 RX ORDER — TRANEXAMIC ACID 10 MG/ML
1 INJECTION, SOLUTION INTRAVENOUS EVERY 30 MIN PRN
Status: DISCONTINUED | OUTPATIENT
Start: 2025-04-30 | End: 2025-05-02 | Stop reason: HOSPADM

## 2025-04-30 RX ORDER — ONDANSETRON 2 MG/ML
4 INJECTION INTRAMUSCULAR; INTRAVENOUS EVERY 30 MIN PRN
Status: DISCONTINUED | OUTPATIENT
Start: 2025-04-30 | End: 2025-04-30 | Stop reason: HOSPADM

## 2025-04-30 RX ORDER — ONDANSETRON 4 MG/1
4 TABLET, ORALLY DISINTEGRATING ORAL EVERY 6 HOURS PRN
Status: DISCONTINUED | OUTPATIENT
Start: 2025-04-30 | End: 2025-05-02 | Stop reason: HOSPADM

## 2025-04-30 RX ORDER — FLUTICASONE PROPIONATE 50 MCG
1 SPRAY, SUSPENSION (ML) NASAL DAILY
Status: DISCONTINUED | OUTPATIENT
Start: 2025-04-30 | End: 2025-05-02 | Stop reason: HOSPADM

## 2025-04-30 RX ORDER — CEFAZOLIN SODIUM/WATER 2 G/20 ML
2 SYRINGE (ML) INTRAVENOUS SEE ADMIN INSTRUCTIONS
Status: DISCONTINUED | OUTPATIENT
Start: 2025-04-30 | End: 2025-05-02 | Stop reason: HOSPADM

## 2025-04-30 RX ORDER — MORPHINE SULFATE 1 MG/ML
INJECTION, SOLUTION EPIDURAL; INTRATHECAL; INTRAVENOUS PRN
Status: DISCONTINUED | OUTPATIENT
Start: 2025-04-30 | End: 2025-04-30

## 2025-04-30 RX ORDER — LOPERAMIDE HYDROCHLORIDE 2 MG/1
2 CAPSULE ORAL
Status: DISCONTINUED | OUTPATIENT
Start: 2025-04-30 | End: 2025-05-02 | Stop reason: HOSPADM

## 2025-04-30 RX ORDER — CITRIC ACID/SODIUM CITRATE 334-500MG
30 SOLUTION, ORAL ORAL
Status: DISCONTINUED | OUTPATIENT
Start: 2025-04-30 | End: 2025-05-02 | Stop reason: HOSPADM

## 2025-04-30 RX ORDER — SODIUM CHLORIDE, SODIUM LACTATE, POTASSIUM CHLORIDE, CALCIUM CHLORIDE 600; 310; 30; 20 MG/100ML; MG/100ML; MG/100ML; MG/100ML
INJECTION, SOLUTION INTRAVENOUS CONTINUOUS
Status: DISCONTINUED | OUTPATIENT
Start: 2025-04-30 | End: 2025-04-30 | Stop reason: HOSPADM

## 2025-04-30 RX ORDER — HYDROMORPHONE HYDROCHLORIDE 1 MG/ML
0.4 INJECTION, SOLUTION INTRAMUSCULAR; INTRAVENOUS; SUBCUTANEOUS EVERY 5 MIN PRN
Status: DISCONTINUED | OUTPATIENT
Start: 2025-04-30 | End: 2025-04-30 | Stop reason: HOSPADM

## 2025-04-30 RX ORDER — ONDANSETRON 2 MG/ML
4 INJECTION INTRAMUSCULAR; INTRAVENOUS EVERY 6 HOURS PRN
Status: DISCONTINUED | OUTPATIENT
Start: 2025-04-30 | End: 2025-05-02 | Stop reason: HOSPADM

## 2025-04-30 RX ORDER — OXYTOCIN/0.9 % SODIUM CHLORIDE 30/500 ML
PLASTIC BAG, INJECTION (ML) INTRAVENOUS CONTINUOUS PRN
Status: DISCONTINUED | OUTPATIENT
Start: 2025-04-30 | End: 2025-04-30

## 2025-04-30 RX ORDER — DEXAMETHASONE SODIUM PHOSPHATE 10 MG/ML
INJECTION, SOLUTION INTRAMUSCULAR; INTRAVENOUS PRN
Status: DISCONTINUED | OUTPATIENT
Start: 2025-04-30 | End: 2025-04-30

## 2025-04-30 RX ORDER — MISOPROSTOL 200 UG/1
400 TABLET ORAL
Status: DISCONTINUED | OUTPATIENT
Start: 2025-04-30 | End: 2025-05-02 | Stop reason: HOSPADM

## 2025-04-30 RX ORDER — NALBUPHINE HYDROCHLORIDE 20 MG/ML
2.5-5 INJECTION INTRAMUSCULAR; INTRAVENOUS; SUBCUTANEOUS EVERY 6 HOURS PRN
Status: DISCONTINUED | OUTPATIENT
Start: 2025-04-30 | End: 2025-05-02 | Stop reason: HOSPADM

## 2025-04-30 RX ORDER — OXYTOCIN 10 [USP'U]/ML
10 INJECTION, SOLUTION INTRAMUSCULAR; INTRAVENOUS
Status: DISCONTINUED | OUTPATIENT
Start: 2025-04-30 | End: 2025-05-02 | Stop reason: HOSPADM

## 2025-04-30 RX ORDER — DEXAMETHASONE SODIUM PHOSPHATE 4 MG/ML
4 INJECTION, SOLUTION INTRA-ARTICULAR; INTRALESIONAL; INTRAMUSCULAR; INTRAVENOUS; SOFT TISSUE
Status: DISCONTINUED | OUTPATIENT
Start: 2025-04-30 | End: 2025-04-30 | Stop reason: HOSPADM

## 2025-04-30 RX ORDER — ACETAMINOPHEN 325 MG/1
975 TABLET ORAL ONCE
Status: COMPLETED | OUTPATIENT
Start: 2025-04-30 | End: 2025-04-30

## 2025-04-30 RX ORDER — KETOROLAC TROMETHAMINE 15 MG/ML
15 INJECTION, SOLUTION INTRAMUSCULAR; INTRAVENOUS EVERY 6 HOURS
Status: COMPLETED | OUTPATIENT
Start: 2025-05-01 | End: 2025-05-01

## 2025-04-30 RX ORDER — FENTANYL/ROPIVACAINE/NS/PF 2MCG/ML-.1
PLASTIC BAG, INJECTION (ML) EPIDURAL
Status: DISCONTINUED | OUTPATIENT
Start: 2025-04-30 | End: 2025-05-02 | Stop reason: HOSPADM

## 2025-04-30 RX ORDER — AMOXICILLIN 250 MG
1 CAPSULE ORAL 2 TIMES DAILY
Status: DISCONTINUED | OUTPATIENT
Start: 2025-04-30 | End: 2025-05-02 | Stop reason: HOSPADM

## 2025-04-30 RX ORDER — SIMETHICONE 80 MG
80 TABLET,CHEWABLE ORAL 4 TIMES DAILY PRN
Status: DISCONTINUED | OUTPATIENT
Start: 2025-04-30 | End: 2025-05-02 | Stop reason: HOSPADM

## 2025-04-30 RX ORDER — NALOXONE HYDROCHLORIDE 0.4 MG/ML
0.1 INJECTION, SOLUTION INTRAMUSCULAR; INTRAVENOUS; SUBCUTANEOUS
Status: DISCONTINUED | OUTPATIENT
Start: 2025-04-30 | End: 2025-04-30 | Stop reason: HOSPADM

## 2025-04-30 RX ORDER — ONDANSETRON 2 MG/ML
INJECTION INTRAMUSCULAR; INTRAVENOUS PRN
Status: DISCONTINUED | OUTPATIENT
Start: 2025-04-30 | End: 2025-04-30

## 2025-04-30 RX ORDER — AMOXICILLIN 250 MG
2 CAPSULE ORAL 2 TIMES DAILY
Status: DISCONTINUED | OUTPATIENT
Start: 2025-04-30 | End: 2025-05-02 | Stop reason: HOSPADM

## 2025-04-30 RX ORDER — SODIUM CHLORIDE, SODIUM LACTATE, POTASSIUM CHLORIDE, CALCIUM CHLORIDE 600; 310; 30; 20 MG/100ML; MG/100ML; MG/100ML; MG/100ML
INJECTION, SOLUTION INTRAVENOUS CONTINUOUS
Status: DISCONTINUED | OUTPATIENT
Start: 2025-04-30 | End: 2025-05-02 | Stop reason: HOSPADM

## 2025-04-30 RX ORDER — BUPIVACAINE HYDROCHLORIDE 2.5 MG/ML
INJECTION, SOLUTION EPIDURAL; INFILTRATION; INTRACAUDAL; PERINEURAL
Status: COMPLETED | OUTPATIENT
Start: 2025-04-30 | End: 2025-04-30

## 2025-04-30 RX ADMIN — KETOROLAC TROMETHAMINE 15 MG: 30 INJECTION, SOLUTION INTRAMUSCULAR at 18:38

## 2025-04-30 RX ADMIN — PHENYLEPHRINE HYDROCHLORIDE 100 MCG: 10 INJECTION INTRAVENOUS at 18:51

## 2025-04-30 RX ADMIN — DEXAMETHASONE SODIUM PHOSPHATE 10 MG: 10 INJECTION, SOLUTION INTRAMUSCULAR; INTRAVENOUS at 18:24

## 2025-04-30 RX ADMIN — LIDOCAINE HYDROCHLORIDE,EPINEPHRINE BITARTRATE 5 ML: 20; .005 INJECTION, SOLUTION EPIDURAL; INFILTRATION; INTRACAUDAL; PERINEURAL at 18:00

## 2025-04-30 RX ADMIN — LABETALOL HYDROCHLORIDE 200 MG: 200 TABLET, FILM COATED ORAL at 21:54

## 2025-04-30 RX ADMIN — PHENYLEPHRINE HYDROCHLORIDE 100 MCG: 10 INJECTION INTRAVENOUS at 18:31

## 2025-04-30 RX ADMIN — PHENYLEPHRINE HYDROCHLORIDE 100 MCG: 10 INJECTION INTRAVENOUS at 18:39

## 2025-04-30 RX ADMIN — FENTANYL CITRATE 50 MCG: 50 INJECTION, SOLUTION INTRAMUSCULAR; INTRAVENOUS at 04:15

## 2025-04-30 RX ADMIN — Medication 2 G: at 17:58

## 2025-04-30 RX ADMIN — FENTANYL CITRATE 50 MCG: 50 INJECTION, SOLUTION INTRAMUSCULAR; INTRAVENOUS at 02:38

## 2025-04-30 RX ADMIN — ONDANSETRON 4 MG: 2 INJECTION INTRAMUSCULAR; INTRAVENOUS at 18:00

## 2025-04-30 RX ADMIN — FENTANYL CITRATE 50 MCG: 50 INJECTION, SOLUTION INTRAMUSCULAR; INTRAVENOUS at 10:20

## 2025-04-30 RX ADMIN — BUPIVACAINE HYDROCHLORIDE 5 ML: 2.5 INJECTION, SOLUTION EPIDURAL; INFILTRATION; INTRACAUDAL at 12:54

## 2025-04-30 RX ADMIN — LIDOCAINE HYDROCHLORIDE,EPINEPHRINE BITARTRATE 5 ML: 20; .005 INJECTION, SOLUTION EPIDURAL; INFILTRATION; INTRACAUDAL; PERINEURAL at 18:03

## 2025-04-30 RX ADMIN — SODIUM CHLORIDE, SODIUM LACTATE, POTASSIUM CHLORIDE, AND CALCIUM CHLORIDE: .6; .31; .03; .02 INJECTION, SOLUTION INTRAVENOUS at 18:16

## 2025-04-30 RX ADMIN — Medication 2 MILLI-UNITS/MIN: at 00:28

## 2025-04-30 RX ADMIN — SENNOSIDES AND DOCUSATE SODIUM 2 TABLET: 50; 8.6 TABLET ORAL at 22:43

## 2025-04-30 RX ADMIN — Medication 2 MG: at 18:26

## 2025-04-30 RX ADMIN — SODIUM CHLORIDE, SODIUM LACTATE, POTASSIUM CHLORIDE, AND CALCIUM CHLORIDE: .6; .31; .03; .02 INJECTION, SOLUTION INTRAVENOUS at 00:19

## 2025-04-30 RX ADMIN — SODIUM CHLORIDE, SODIUM LACTATE, POTASSIUM CHLORIDE, AND CALCIUM CHLORIDE: .6; .31; .03; .02 INJECTION, SOLUTION INTRAVENOUS at 08:38

## 2025-04-30 RX ADMIN — FLUTICASONE PROPIONATE 1 SPRAY: 50 SPRAY, METERED NASAL at 22:44

## 2025-04-30 RX ADMIN — ACETAMINOPHEN 975 MG: 325 TABLET ORAL at 23:35

## 2025-04-30 RX ADMIN — SODIUM CHLORIDE, SODIUM LACTATE, POTASSIUM CHLORIDE, AND CALCIUM CHLORIDE: .6; .31; .03; .02 INJECTION, SOLUTION INTRAVENOUS at 20:18

## 2025-04-30 RX ADMIN — PHENYLEPHRINE HYDROCHLORIDE 200 MCG: 10 INJECTION INTRAVENOUS at 18:45

## 2025-04-30 RX ADMIN — SODIUM CHLORIDE, SODIUM LACTATE, POTASSIUM CHLORIDE, AND CALCIUM CHLORIDE: .6; .31; .03; .02 INJECTION, SOLUTION INTRAVENOUS at 17:58

## 2025-04-30 RX ADMIN — FENTANYL CITRATE 50 MCG: 50 INJECTION, SOLUTION INTRAMUSCULAR; INTRAVENOUS at 01:54

## 2025-04-30 RX ADMIN — SODIUM CHLORIDE, SODIUM LACTATE, POTASSIUM CHLORIDE, AND CALCIUM CHLORIDE: .6; .31; .03; .02 INJECTION, SOLUTION INTRAVENOUS at 13:31

## 2025-04-30 RX ADMIN — FENTANYL CITRATE 50 MCG: 50 INJECTION, SOLUTION INTRAMUSCULAR; INTRAVENOUS at 08:00

## 2025-04-30 RX ADMIN — FENTANYL CITRATE 50 MCG: 50 INJECTION, SOLUTION INTRAMUSCULAR; INTRAVENOUS at 06:56

## 2025-04-30 RX ADMIN — FENTANYL CITRATE 50 MCG: 50 INJECTION, SOLUTION INTRAMUSCULAR; INTRAVENOUS at 06:02

## 2025-04-30 RX ADMIN — ACETAMINOPHEN 975 MG: 325 TABLET, FILM COATED ORAL at 17:30

## 2025-04-30 RX ADMIN — FENTANYL CITRATE 50 MCG: 50 INJECTION, SOLUTION INTRAMUSCULAR; INTRAVENOUS at 08:57

## 2025-04-30 RX ADMIN — TRANEXAMIC ACID 1 G: 10 INJECTION, SOLUTION INTRAVENOUS at 17:43

## 2025-04-30 RX ADMIN — LABETALOL HYDROCHLORIDE 200 MG: 200 TABLET, FILM COATED ORAL at 08:41

## 2025-04-30 RX ADMIN — AZITHROMYCIN MONOHYDRATE 500 MG: 500 INJECTION, POWDER, LYOPHILIZED, FOR SOLUTION INTRAVENOUS at 18:05

## 2025-04-30 RX ADMIN — Medication: at 12:41

## 2025-04-30 RX ADMIN — Medication 300 ML/HR: at 18:24

## 2025-04-30 ASSESSMENT — ACTIVITIES OF DAILY LIVING (ADL)
ADLS_ACUITY_SCORE: 15
ADLS_ACUITY_SCORE: 17
ADLS_ACUITY_SCORE: 15
ADLS_ACUITY_SCORE: 17
ADLS_ACUITY_SCORE: 15

## 2025-04-30 NOTE — TELEPHONE ENCOUNTER
Chief Complaint   Patient presents with    Previsit     consult for L UTDA1-due date May 8          Phan Ulrich MA

## 2025-04-30 NOTE — CONSULTS
CONSULTATION - OB    NAME: Ashley Cross   : 2005   MRN: [id number]      ADMISSION DATE: 2025    PCP:  Linda Maldonado have been requested by Dr. Maldonado to evaluate Ashley Cross for labor dystocia.     CHIEF COMPLAINT: Arrest of descent    HPI:  Ashley Cross is a 19 year old , that was admitted to the hospital for active labor. She had artificial rupture of membranes at 9 am and has been having slow progress.  She received IV Pitocin infusion and an epidural and the presenting part has no descended beyond 0 station.    Patient's last menstrual period was 2024 (exact date).       PAST MEDICAL HISTORY:  Past Medical History:   Diagnosis Date    Delayed delivery after SROM (spontaneous rupture of membranes) 2025    Head lice 10/13/2015    Hypertension     Varicella        PAST SURGICAL HISTORY:  History reviewed. No pertinent surgical history.    SOCIAL HISTORY:  Social History     Socioeconomic History    Marital status: Single     Spouse name: Not on file    Number of children: Not on file    Years of education: Not on file    Highest education level: Not on file   Occupational History    Not on file   Tobacco Use    Smoking status: Never     Passive exposure: Never    Smokeless tobacco: Never   Vaping Use    Vaping status: Never Used   Substance and Sexual Activity    Alcohol use: Never    Drug use: Never    Sexual activity: Yes     Partners: Male     Birth control/protection: None   Other Topics Concern    Not on file   Social History Narrative    Parents: MoWeih & SayKuWah  Siblings:   Kwe Gagan Eh Michele   Say Kpaw Moo   Eh Paw Ter     Social Drivers of Health     Financial Resource Strain: High Risk (2025)    Financial Resource Strain     Within the past 12 months, have you or your family members you live with been unable to get utilities (heat, electricity) when it was really needed?: Yes   Food Insecurity: Low Risk  (2025)    Food Insecurity     Within the past 12  months, did you worry that your food would run out before you got money to buy more?: No     Within the past 12 months, did the food you bought just not last and you didn t have money to get more?: No   Transportation Needs: Low Risk  (4/29/2025)    Transportation Needs     Within the past 12 months, has lack of transportation kept you from medical appointments, getting your medicines, non-medical meetings or appointments, work, or from getting things that you need?: No   Physical Activity: Insufficiently Active (1/19/2022)    Exercise Vital Sign     Days of Exercise per Week: 2 days     Minutes of Exercise per Session: 30 min   Stress: Not on file   Social Connections: Not on file   Interpersonal Safety: Unknown (4/29/2025)    Interpersonal Safety     Do you feel physically and emotionally safe where you currently live?: Patient unable to answer     Within the past 12 months, have you been hit, slapped, kicked or otherwise physically hurt by someone?: Patient unable to answer     Within the past 12 months, have you been humiliated or emotionally abused in other ways by your partner or ex-partner?: Patient unable to answer   Housing Stability: Low Risk  (4/29/2025)    Housing Stability     Do you have housing? : Yes     Are you worried about losing your housing?: No       MEDICATIONS:  Current Facility-Administered Medications   Medication Dose Route Frequency Provider Last Rate Last Admin    acetaminophen (TYLENOL) tablet 975 mg  975 mg Oral Once Radha Muñiz MD        aspirin EC tablet 81 mg  81 mg Oral Daily Linda Maldonado MD   81 mg at 04/29/25 1229    azithromycin (ZITHROMAX) 500 mg in sodium chloride 0.9 % 250 mL intermittent infusion  500 mg Intravenous Pre-Op/Pre-procedure x 1 dose Radha Muñiz MD        carboprost (HEMABATE) injection 250 mcg  250 mcg Intramuscular Q15 Min PRN Radha Muñiz MD        And    loperamide (IMODIUM) capsule 4 mg  4 mg Oral Once PRN Radha Muñiz MD         carboprost (HEMABATE) injection 250 mcg  250 mcg Intramuscular Q15 Min PRN Linda Maldonado MD        And    loperamide (IMODIUM) capsule 4 mg  4 mg Oral Once PRN Linda Maldonado MD        ceFAZolin Sodium (ANCEF) injection 2 g  2 g Intravenous Pre-Op/Pre-procedure x 1 dose Radha Muñiz MD        ceFAZolin Sodium (ANCEF) injection 2 g  2 g Intravenous See Admin Instructions Radha Muñiz MD        fentaNYL (PF) (SUBLIMAZE) injection 50 mcg  50 mcg Intravenous Q30 Min PRN Linda Maldonado MD   50 mcg at 04/30/25 1020    fentaNYL (SUBLIMAZE) 2 mcg/mL, ROPivacaine (NAROPIN) 0.1% in NaCl 0.9% for PIB + PCEA PREMIX   EPIDURAL PIB Yamilet Baer MD   $New Syringe/Cartridge at 04/30/25 1241    fluticasone (FLONASE) 50 MCG/ACT spray 1 spray  1 spray Both Nostrils Daily Linda Maldonado MD        hydrALAZINE (APRESOLINE) injection 5-10 mg  5-10 mg Intravenous Q20 Min PRN Linda Maldonado MD        ketorolac (TORADOL) injection 15 mg  15 mg Intravenous Once PRN Linda Maldonado MD        Or    ketorolac (TORADOL) injection 15 mg  15 mg Intramuscular Once PRN Linda Maldonado MD        Or    ibuprofen (ADVIL/MOTRIN) tablet 800 mg  800 mg Oral Once PRN Linda Maldonado MD        labetalol (NORMODYNE) tablet 200 mg  200 mg Oral BID Linda Maldonado MD   200 mg at 04/30/25 0841    labetalol (NORMODYNE/TRANDATE) injection 20-40 mg  20-40 mg Intravenous Q10 Min PRN Linda Maldonado MD        lactated ringers BOLUS 250 mL  250 mL Intravenous Once PRN Yamilet Baer MD        lactated ringers BOLUS 500 mL  500 mL Intravenous Once Radha Muñiz MD        lactated ringers BOLUS 500 mL  500 mL Intravenous Once PRN Linda Maldonado MD   Rate Change at 04/30/25 1231    lactated ringers infusion   Intravenous Continuous Radha Muñiz MD        lactated ringers infusion   mL/hr Intravenous Continuous Linda Maldonado MD   Held at 04/29/25 1146    lactated ringers infusion   Intravenous Continuous PRN Linda Maldonado  mL/hr  at 04/30/25 1331 New Bag at 04/30/25 1331    lidocaine (LMX4) cream   Topical Q1H PRN Radha Muñiz MD        lidocaine (LMX4) cream   Topical Q1H PRN Linda Maldonado MD        lidocaine (LMX4) cream   Topical Q1H PRN Linda Maldonado MD        lidocaine 1 % 0.1-1 mL  0.1-1 mL Other Q1H PRN Radha Muñiz MD        lidocaine 1 % 0.1-1 mL  0.1-1 mL Other Q1H PRN Linda Maldonado MD        lidocaine 1 % 0.1-1 mL  0.1-1 mL Other Q1H PRN Linda Maldonado MD        lidocaine 1 % 0.1-20 mL  0.1-20 mL Subcutaneous Once PRN Linda Maldonado MD        loperamide (IMODIUM) capsule 2 mg  2 mg Oral Q2H PRN Radha Muñiz MD        loperamide (IMODIUM) capsule 2 mg  2 mg Oral Q2H PRN Linda Maldonado MD        methylergonovine (METHERGINE) injection 200 mcg  200 mcg Intramuscular Q2H PRN Radha Muñiz MD        methylergonovine (METHERGINE) injection 200 mcg  200 mcg Intramuscular Q2H PRN Linda Maldonado MD        metoclopramide (REGLAN) tablet 10 mg  10 mg Oral Q6H PRN Linda Maldonado MD        Or    metoclopramide (REGLAN) injection 10 mg  10 mg Intravenous Q6H PRN Linda Maldonado MD        misoprostol (CYTOTEC) tablet 400 mcg  400 mcg Oral ONCE PRN REPEAT PER INSTRUCTIONS Radha Muñiz MD        Or    misoprostol (CYTOTEC) tablet 800 mcg  800 mcg Rectal ONCE PRN REPEAT PER INSTRUCTIONS Radha Muñiz MD        misoprostol (CYTOTEC) tablet 400 mcg  400 mcg Oral ONCE PRN REPEAT PER INSTRUCTIONS Linda Maldonado MD        Or    misoprostol (CYTOTEC) tablet 800 mcg  800 mcg Rectal ONCE PRN REPEAT PER INSTRUCTIONS Linda Maldonado MD        nalbuphine (NUBAIN) injection 2.6-5 mg  2.6-5 mg Intravenous Q6H PRN Yamilet Baer MD        naloxone (NARCAN) injection 0.2 mg  0.2 mg Intravenous Q2 Min PRN Linda Maldonado MD        Or    naloxone (NARCAN) injection 0.4 mg  0.4 mg Intravenous Q2 Min PRN Linda Maldonado MD        Or    naloxone (NARCAN) injection 0.2 mg  0.2 mg Intramuscular Q2 Min PRN Linda Maldonado MD        Or     naloxone (NARCAN) injection 0.4 mg  0.4 mg Intramuscular Q2 Min PRN Linda Maldonado MD        nitrous oxide/oxygen 50/50 blend   Inhalation Continuous PRN Linda Maldonado MD        ondansetron (ZOFRAN ODT) ODT tab 4 mg  4 mg Oral Q6H PRN Linda Maldonado MD        Or    ondansetron (ZOFRAN) injection 4 mg  4 mg Intravenous Q6H PRN Linda Maldonado MD        oxytocin (PITOCIN) 30 units in 500 mL 0.9% NaCl infusion  340 mL/hr Intravenous Continuous PRN Radha Muñiz MD        oxytocin (PITOCIN) 30 units in 500 mL 0.9% NaCl infusion  100-340 mL/hr Intravenous Continuous PRN Linda Maldonado MD        oxytocin (PITOCIN) 30 units in 500 mL 0.9% NaCl infusion  340 mL/hr Intravenous Continuous PRN Linda Maldonado MD        oxytocin (PITOCIN) 30 units in 500 mL 0.9% NaCl infusion  1-24 magui-units/min Intravenous Continuous Linda Maldonado MD   Stopped at 04/30/25 1701    oxytocin (PITOCIN) injection 10 Units  10 Units Intramuscular Once PRN Radha Muñiz MD        oxytocin (PITOCIN) injection 10 Units  10 Units Intramuscular Once PRN Linda Maldonado MD        oxytocin (PITOCIN) injection 10 Units  10 Units Intramuscular Once PRN Linda Maldonado MD        phenylephrine (RADHA-SYNEPHRINE) injection 100 mcg  100 mcg Intravenous Q5 Min PRN Yamilet Baer MD        prochlorperazine (COMPAZINE) tablet 10 mg  10 mg Oral Q6H PRN Linda Maldonado MD        Or    prochlorperazine (COMPAZINE) injection 10 mg  10 mg Intravenous Q6H PRN Linda Maldonado MD        sodium chloride (PF) 0.9% PF flush 3 mL  3 mL Intracatheter Q8H Radha Muñiz MD        sodium chloride (PF) 0.9% PF flush 3 mL  3 mL Intracatheter q1 min prn Radha Muñiz MD        sodium chloride (PF) 0.9% PF flush 3 mL  3 mL Intracatheter Q8H ENZO Linda Maldonado MD   3 mL at 04/29/25 1930    sodium chloride (PF) 0.9% PF flush 3 mL  3 mL Intracatheter q1 min prn Linda Maldonado MD        sodium chloride (PF) 0.9% PF flush 3 mL  3 mL Intracatheter Q8H ENZO Maldonado  "MD Linda        sodium chloride (PF) 0.9% PF flush 3 mL  3 mL Intracatheter q1 min prn Linda Maldonado MD        sodium citrate-citric acid (BICITRA) solution 30 mL  30 mL Oral Pre-Op/Pre-procedure x 1 dose Radha Muñiz MD        sodium citrate-citric acid (BICITRA) solution 30 mL  30 mL Oral Once PRN Linda Maldonado MD        terbutaline (BRETHINE) injection 0.25 mg  0.25 mg Subcutaneous Once PRN Linda Maldonado MD        tranexamic acid 1 g in 100 mL NS IV bag (premix)  1 g Intravenous Q30 Min PRN Radha Muñiz MD        tranexamic acid 1 g in 100 mL NS IV bag (premix)  1 g Intravenous Once Radha Muñiz MD        tranexamic acid 1 g in 100 mL NS IV bag (premix)  1 g Intravenous Q30 Min PRN Linda Maldonado MD           ALLERGIES:  No Known Allergies    REVIEW OF SYSTEMS    Negative except what is stated in the HPI    PHYSICAL EXAM:  /77   Pulse 102   Temp 99.2  F (37.3  C) (Axillary)   Resp 16   Ht 1.473 m (4' 10\")   Wt 69.9 kg (154 lb)   LMP 2024 (Exact Date)   SpO2 95%   BMI 32.19 kg/m     General Appearance: Alert, appropriate appearance for age. No acute distress  HEENT : Grossly normal  Chest/Respiratory: Normal chest wall and respirations.   Cardiovascular: Regular rate and rhythm   Gastrointestinal: gravid abdomen, hard with contractions  Pelvic Exam Female:  cervix completely dilated, 0 station, caput 3 cm,   Musculoskeletal Exam: normal  Psychiatric: Alert and oriented, appropriate affect.    IMPRESSION:  19 year old  Female,  with arrest of descent.    RECOMMENDATIONS:   section.  Risks of bleeding, infection, reaction to the anesthesia, damage to the bladder, bowel, ureters or other organs reviewed, her questions were answered.  She gave me verbal consent for a blood transfusion if she has severe bleeding.  Discussed postpartum hemorrhage because she has been ritchie for more than 24 hours.    Thank you for allowing us to participate in the care of this " patient.  Please contact us with any questions/concerns.    Radha Muñiz MD     CC: Linda Maldonado,

## 2025-04-30 NOTE — PROGRESS NOTES
Writer encouraged different positions to try with -1 station,  bedside and Htee reported she would lay down and try the peanut ball. Writer positioned the be so Htee could lay down when she is ready.

## 2025-04-30 NOTE — PROGRESS NOTES
Face to face report given with opportunity to observe patient.    Report given to Radha Rosales RN   4/30/2025  7:17 AM

## 2025-04-30 NOTE — OP NOTE
Preoperative Diagnoses:   Intrauterine pregnancy at 38w6d   Arrest of descent  Chronic hypertension    Postoperative Diagnoses: *  Intrauterine pregnancy at 38w6d   Arrest of descent  Chronic hypertension    Procedure:   Primary  section    Surgeon:   Radha Muñiz MD    Assistant:   MD Dr Joel Holguin was medically necessary as a surgical assistant for this surgery for retraction, achievement of hemostasis, fundal pressure and wound closure. Indications:    Findings:   Live Male infant in cephalic presentation.  Normal uterus, ovaries and tubes    QBL:   0    After risks that included bleeding, infection, reaction to the anesthesia, damage to the bowel, bladder or other organs were discussed with the patient, she signed informed consent. Intravenous antibiotics were given prior to the procedure.   The patient was taken to the operating room where epidural anesthesia was found to be adequate. She was prepped and draped in the normal sterile fashion in the dorsal supine position with a leftward tilt.     Surgical pause was conducted with identification of the patient, procedure, site, risks and possible complications and specimens.  With everyone in the room in agreement, surgery started.    A Pfannensteil incision was made and carried through to the underlying layer of fascia. The fascia was incised in the midline and the incision was extended laterally with the Jasso scissors. The superior aspect of the fascial incision was grasped with Kocher clamps, elevated, and the underlying rectus muscles were dissected off bluntly. The rectus muscles were  in the midline, and the peritoneum was identified, tented up, and entered bluntly with the fingers. The peritoneal incision was then extended superiorly and inferiorly with good visualization of the bladder.   The Jesus retractor was inserted and the lower uterine segment was incised in a transverse fashion with a scalpel. The uterine  incision was then extended laterally in a blunt fashion. The infant's head was delivered atraumatically. The cord was clamped and cut after 30 seconds. The infant was handed off to the awaiting pediatricians.     The placenta was then removed spontaneously and the uterus was exteriorized, and cleared of all clots and debris. The uterine incision was repaired with 0-Monocryl in a running, locked fashion. A second imbricating layer was used to obtain excellent hemostasis.     The uterus was returned to the abdomen, and the gutters were cleared of all clots and debris. The fascia was reapproximated with 0-Maxon in a running fashion.   The subcutaneous layer was irrigated and made hemostatic with the Bovie. It was approximated with a continues running stitch of 3/0 plain gut.    The skin was closed with subcuticular stitches of 230 Stratafix. There were no complications. The patient tolerated the procedure well. Sponge, laps, instruments and needle counts were correct times two. She was taken to the recovery room in stable condition.     Radha Muñiz MD 4/30/2025

## 2025-04-30 NOTE — PROVIDER NOTIFICATION
Charge RN called, asked to ask Dr. Madlonado if she would like to check SVE or have writer check. Per Charge RN Dr. Maldonado will come check SVE.

## 2025-04-30 NOTE — PROVIDER NOTIFICATION
Dr. Maldonado called notified of SVE, BBOW, pit decreased to 8 mU. Htee requesting another dose of fentanyl and at this time does not want an epidural. Plan for Dr. Maldonado to come and evaluate.

## 2025-04-30 NOTE — ANESTHESIA PROCEDURE NOTES
Epidural catheter Procedure Note    Pre-Procedure   Staff -        Anesthesiologist:  Yamilet Baer MD       Performed By: anesthesiologist       Location: OB       Procedure Start/Stop Times: 4/30/2025 12:41 PM and 4/30/2025 12:54 PM       Pre-Anesthestic Checklist: patient identified, IV checked, risks and benefits discussed, informed consent, monitors and equipment checked, pre-op evaluation, at physician/surgeon's request and post-op pain management  Timeout:       Correct Patient: Yes        Correct Procedure: Yes        Correct Site: Yes        Correct Position: Yes   Procedure Documentation  Procedure: epidural catheter         Patient Position: sitting       Patient Prep/Sterile Barriers: sterile gloves, mask, patient draped       Skin prep: Betadine and Chloraprep       Local skin infiltrated with mL of 1% lidocaine.        Insertion Site: L2-3. (midline approach).       Technique: LORT air        CULLEN at 5 cm.       Needle Type: Touhy needle       Needle Gauge: 17.        Needle Length (Inches): 3.5        Catheter: 20 G.          Catheter threaded easily.           Threaded 10 cm at skin.         # of attempts: 3 and  # of redirects:  2    Assessment/Narrative         Paresthesias: No.       Test dose of 3 mL lidocaine 1.5% w/ 1:200,000 epinephrine at.         Test dose negative, 3 minutes after injection, for signs of intravascular, subdural, or intrathecal injection.       Insertion/Infusion Method: LORT air       Aspiration negative for Heme or CSF via Epidural Catheter.    Medication(s) Administered   0.25% Bupivacaine PF (Epidural) - EPIDURAL   5 mL - 4/30/2025 12:54:00 PM  Medication Administration Time: 4/30/2025 12:41 PM     Comments:  Epi   R/b/a discussed, patient agrees to have an epidural catheter placement.   Local infiltration of skin, advancement of needle without paresthesias, excellent Cullen to NS.   Catheter advanced without resistance nor paresthesias, negative aspiration for heme or  "CSF.  Remaining 2 mL epidural test dose diluted with 3 mL sterile water and given via epidural bolus.  Patient tolerated the procedure well, all questions answered.        FOR [facility] ONLY:   Pain Team Contact information: please page the Pain Team Via [employer]. Search \"Pain\". During daytime hours, please page the attending first. At night please page the resident first.      "

## 2025-04-30 NOTE — PROGRESS NOTES
Interpretation services used to discuss the following with pt;    -Pt educated on pitocin augmentation that is recommended by physician to be started at midnight.   -Pain management.  -Katie monitor placement, katie patches may cause skin redness/irritation.     Pt consented to pitocin augmentation and katie placement. Pt reports pain level acceptable at this time.

## 2025-04-30 NOTE — PROGRESS NOTES
Dr. Maldonado notified writer decreased pitocin to 4 mU, plan for Dr. Maldonado to come bedside 1145 for SVE.

## 2025-04-30 NOTE — ANESTHESIA PREPROCEDURE EVALUATION
"Anesthesia Pre-Procedure Evaluation    Patient: Ashley Cross   MRN: [id number] : 2005        Procedure :           Past Medical History:   Diagnosis Date     Delayed delivery after SROM (spontaneous rupture of membranes) 2025     Head lice 10/13/2015     Hypertension      Varicella       History reviewed. No pertinent surgical history.   No Known Allergies   Social History     Tobacco Use     Smoking status: Never     Passive exposure: Never     Smokeless tobacco: Never   Substance Use Topics     Alcohol use: Never      Wt Readings from Last 1 Encounters:   25 69.9 kg (154 lb) (84%, Z= 0.98)*     * Growth percentiles are based on CDC (Girls, 2-20 Years) data.        Anesthesia Evaluation   Pt has not had prior anesthetic         ROS/MED HX  ENT/Pulmonary:       Neurologic:       Cardiovascular: Comment: Chronic HTN      METS/Exercise Tolerance:     Hematologic:       Musculoskeletal:       GI/Hepatic:       Renal/Genitourinary:       Endo:       Psychiatric/Substance Use:       Infectious Disease:       Malignancy:       Other:            Physical Exam    Airway        Mallampati: II   TM distance: > 3 FB   Neck ROM: full   Mouth opening: > 3 cm    Respiratory Devices and Support         Dental           Cardiovascular             Pulmonary               OUTSIDE LABS:  CBC:   Lab Results   Component Value Date    WBC 14.0 (H) 2025    WBC 16.2 (H) 10/11/2024    HGB 13.8 2025    HGB 11.8 2025    HCT 41.5 2025    HCT 39.9 10/11/2024     2025     10/11/2024     BMP:   Lab Results   Component Value Date     2025    POTASSIUM 4.0 2025    CHLORIDE 105 2025    CO2 22 2025    BUN 7.9 2025    CR 0.73 2025    GLC 73 2025     COAGS: No results found for: \"PTT\", \"INR\", \"FIBR\"  POC:   Lab Results   Component Value Date    HCG Positive (A) 10/11/2024     HEPATIC:   Lab Results   Component Value Date    ALBUMIN 3.9 " 04/29/2025    PROTTOTAL 7.9 04/29/2025    ALT 15 04/29/2025    AST 17 04/29/2025    ALKPHOS 240 (H) 04/29/2025    BILITOTAL 0.4 04/29/2025     OTHER:   Lab Results   Component Value Date    A1C 5.5 10/11/2024    JANA 9.4 04/29/2025       Anesthesia Plan    ASA Status:  3       Anesthesia Type: Epidural.              Consents    Anesthesia Plan(s) and associated risks, benefits, and realistic alternatives discussed. Questions answered and patient/representative(s) expressed understanding.     - Discussed:     - Discussed with:  Patient, Spouse            Postoperative Care            Comments:               Yamilet Baer MD    Clinically Significant Risk Factors                   # Hypertension: Noted on problem list

## 2025-04-30 NOTE — PLAN OF CARE
Plan of Care Reviewed With: patient, significant other          Outcome Evaluation: Patient labored independently throughout shift, coping through contractions and feeling and increase in pain. VSS. Monitoring fetal heart rate with IA, increases heard throughout shift and no decreases heard. Plan to start pit at midnight and get an epidural whenever the patient requests.  Problem: Labor  Goal: Stable Fetal Wellbeing  Outcome: Progressing     Problem: Labor  Goal: Effective Progression to Delivery  Outcome: Progressing     Problem: Labor  Goal: Absence of Infection Signs and Symptoms  Outcome: Progressing     Problem: Labor  Goal: Acceptable Pain Control  Outcome: Progressing

## 2025-04-30 NOTE — H&P
"[facility] Labor and Delivery History and Physical    Ashley Cross MRN# [id number]   Age: 19 year old YOB: 2005     Date of Admission:  2025    Primary care provider: Linda Maldonado           Chief Complaint:   Ashley Cross is a 19 year old female who is 38w5d pregnant and being admitted for active labor management.  She presented to labor and delivery this morning with increasing contractions. They are mild but regular. She has no leakage of fluid, bleeding. She reported regular fetal movement. Shortly after presentation she SROM with clear fluid. Her contractions are still regular and tolerable.   This pregnancy is complicated by chronic hypertension well controlled with labetalol. She is working with [employer]. Induction of labor was recommended by 39 weeks.   This infant has history of mild pyelectasis.             Pregnancy history:     OBSTETRIC HISTORY:    OB History    Para Term  AB Living   1 0 0 0 0 0   SAB IAB Ectopic Multiple Live Births   0 0 0 0 0      # Outcome Date GA Lbr Leonides/2nd Weight Sex Type Anes PTL Lv   1 Current                EDC: Estimated Date of Delivery: 25    Prenatal Labs:   Lab Results   Component Value Date    AS Negative 2025    HEPBANG Nonreactive 10/11/2024    CHPCRT Negative 2022    GCPCRT Negative 2022    HGB 13.8 2025       GBS Status:   No results found for: \"GBS\"    Active Problem List  Patient Active Problem List   Diagnosis    Chronic sinusitis    Nasal polyposis    Supervision of normal first pregnancy, antepartum    Chronic hypertension    Fetal renal anomaly, single gestation    Encounter for triage in pregnant patient    Supervision of high risk pregnancy, antepartum    Chronic hypertension affecting pregnancy    Delayed delivery after SROM (spontaneous rupture of membranes)       Medication Prior to Admission  Medications Prior to Admission   Medication Sig Dispense Refill Last Dose/Taking    aspirin " 81 MG EC tablet Take 1 tablet (81 mg) by mouth daily. 100 tablet 3 4/28/2025    labetalol (NORMODYNE) 200 MG tablet Take 1 tablet (200 mg) by mouth 2 times daily. 60 tablet 2 4/28/2025    Prenatal Vit-Fe Fumarate-FA (PRENATAL MULTIVITAMIN W/IRON) 27-0.8 MG tablet Take 1 tablet by mouth daily. 90 tablet 3 4/28/2025   .        Maternal Past Medical History:     Past Medical History:   Diagnosis Date     4/29/2025    Head lice 10/13/2015    Hypertension     Varicella                        Family History:     Family History   Problem Relation Age of Onset    Depression Mother     Anxiety Disorder Mother     Gestational Diabetes Mother      Family history reviewed and updated in [facility]            Social History:     Social History     Tobacco Use    Smoking status: Never     Passive exposure: Never    Smokeless tobacco: Never   Substance Use Topics    Alcohol use: Never            Review of Systems:   The Review of Systems is negative other than noted in the HPI          Physical Exam:   Vitals were reviewed  Temp: 97.6  F (36.4  C) Temp src: Oral BP: 121/77     Resp: 16   O2 Device: None (Room air)    Constitutional:   awake, alert, cooperative, no apparent distress, and appears stated age     Lungs:   No increased work of breathing, good air exchange, clear to auscultation bilaterally, no crackles or wheezing     Cardiovascular:   normal S1 and S2     Abdomen:   gravid     Neurologic:   No focal deficits, gait normal     Skin:   no bruising or bleeding      Cervix:   Membranes: intact   Dilation: 3   Effacement: 80%   Station:0   Consistency: soft   Position: Mid  Presentation:Cephalic  Fetal Heart Rate Tracing: reactive and reassuring  Tocometer: frequency q 3 minutes                       Assessment:   Ashley Cross is a 38w5d pregnant female admitted with active labor management.  Patient Active Problem List   Diagnosis    Chronic hypertension    Fetal renal anomaly, single gestation    Supervision of high risk  pregnancy, antepartum    Chronic hypertension affecting pregnancy      Delayed delivery after SROM (spontaneous rupture of membranes)           Plan:   Admit - see IP orders  Will monitor labor progress, consider pitocin augmentation if needed.   Hypertensive orders placed as needed. Will continue home labetalol. Labs collected.   Pain medication as desired.   Breast and bottle feeding.   GBS negative.   Anticipate     Linda Maldonado MD

## 2025-04-30 NOTE — PLAN OF CARE
"  Problem: Adult Inpatient Plan of Care  Goal: Plan of Care Review  Description: The Plan of Care Review/Shift note should be completed every shift.  The Outcome Evaluation is a brief statement about your assessment that the patient is improving, declining, or no change.  This information will be displayed automatically on your shiftnote.  Outcome: Progressing  Flowsheets (Taken 4/30/2025 0718)  Plan of Care Reviewed With: patient  Overall Patient Progress: improving  Goal: Patient-Specific Goal (Individualized)  Description: You can add care plan individualizations to a care plan. Examples of Individualization might be:  \"Parent requests to be called daily at 9am for status\", \"I have a hard time hearing out of my right ear\", or \"Do not touch me to wake me up as it startlesme\".  Outcome: Progressing  Goal: Absence of Hospital-Acquired Illness or Injury  Outcome: Progressing  Goal: Optimal Comfort and Wellbeing  Outcome: Progressing  Intervention: Monitor Pain and Promote Comfort  Recent Flowsheet Documentation  Taken 4/30/2025 0635 by Taniya Rosales RN  Pain Management Interventions: (pt declines wanting any additional interventions at this time and reports will notify staff if that changes)   declines   medication offered but refused  Taken 4/30/2025 0530 by Taniya Rosales RN  Pain Management Interventions: (pt declines need for sublimaze, epidural, or nitrous oxide at this time)   medication offered but refused   relaxation techniques promoted   therapeutic presence  Taken 4/30/2025 0430 by Taniya Rosales RN  Pain Management Interventions: (pt reports pain level acceptable at this time)   relaxation techniques promoted   therapeutic presence  Taken 4/30/2025 0415 by Taniya Rosales RN  Pain Management Interventions: (sublimaze given per pt requet)   medication (see MAR)   therapeutic presence   relaxation techniques promoted  Taken 4/30/2025 0347 by Taniya Rosales RN  Pain " Management Interventions: (pt denies need for any pain interventions at this time)   medication offered but refused   relaxation techniques promoted   therapeutic presence  Taken 4/30/2025 0250 by Taniya Rosales RN  Pain Management Interventions: (pt reports pain level acceptable at this time)   relaxation techniques promoted   therapeutic presence  Taken 4/30/2025 0238 by Taniya Rosales RN  Pain Management Interventions: (sublimaze given per pt request)   medication (see MAR)   relaxation techniques promoted   therapeutic presence  Taken 4/30/2025 0204 by Taniya Rosales RN  Pain Management Interventions: (pt reports pain level acceptable at this time)   therapeutic presence   relaxation techniques promoted  Taken 4/30/2025 0154 by Taniya Rosales RN  Pain Management Interventions: (sublimaze given per pt request)   medication (see MAR)   therapeutic presence   relaxation techniques promoted  Taken 4/30/2025 0044 by Taniya Rosales RN  Pain Management Interventions:   declines   medication offered but refused  Taken 4/29/2025 2312 by Taniya Rosales RN  Pain Management Interventions: (Pt educated on all pain medication options including but not limited to the following; epidural, sublimaze, and nitrous oxide. Pt denies need for any pain interventions at this time and reports will notify staff if that changes.)   declines   medication offered but refused  Intervention: Provide Person-Centered Care  Recent Flowsheet Documentation  Taken 4/30/2025 0500 by Taniya Rosales RN  Trust Relationship/Rapport:   care explained   choices provided   emotional support provided   empathic listening provided   questions answered   questions encouraged   reassurance provided   thoughts/feelings acknowledged  Taken 4/29/2025 2310 by Taniya Rosales RN  Trust Relationship/Rapport:   care explained   choices provided   emotional support provided   empathic listening provided    questions answered   questions encouraged   reassurance provided   thoughts/feelings acknowledged  Goal: Readiness for Transition of Care  Outcome: Progressing     Problem: Labor  Goal: Hemostasis  Outcome: Progressing  Goal: Stable Fetal Wellbeing  Outcome: Progressing  Goal: Effective Progression to Delivery  Outcome: Progressing  Goal: Absence of Infection Signs and Symptoms  Outcome: Progressing  Goal: Acceptable Pain Control  Outcome: Progressing  Goal: Normal Uterine Contraction Pattern  Outcome: Progressing   Goal Outcome Evaluation:      Plan of Care Reviewed With: patient    Overall Patient Progress: improvingOverall Patient Progress: improving

## 2025-04-30 NOTE — PROVIDER NOTIFICATION
Dr. Weston called and notified of patient request for epidural, per Dr. Pierce eta around 10 minutes.

## 2025-04-30 NOTE — PROVIDER NOTIFICATION
Dr. Maldonado notifed of SVE. Plan to start pitocin by midnight. Additionally, sleep medications, IV fentanyl, and epidural are okay if desired by patient and as appropriate.

## 2025-04-30 NOTE — PROGRESS NOTES
FMOB      Patient has been comfortable with IV fentanyl for pain control.   RN noted 7 cm dilated this morning with bulging forebag.   This was ruptured this morning at 9 am.   Her pitocin has been adjusted to be allow appropriate pattern of contractions.   As the morning progressed, patient has become increasingly uncomfortable with pain, taking only IV fentanyl when needed.   CE: 8-9/90/01.   There has been little progress over four hours.   We discussed management plan. Patient becomes increasing intolerant of pain.   Desires epidural now.   Will consider IUPC placement thereafter. Of note almost twenty four hours since SROM.   She is afebrile. FHT: 140, moderate variability, no decels, +accels, toco q 2-3minutes.   Pitocin at 6mu.   Continue expectant management.     Linda Maldonado MD

## 2025-05-01 LAB — HGB BLD-MCNC: 9.7 G/DL (ref 11.7–15.7)

## 2025-05-01 PROCEDURE — 258N000003 HC RX IP 258 OP 636: Performed by: OBSTETRICS & GYNECOLOGY

## 2025-05-01 PROCEDURE — 250N000013 HC RX MED GY IP 250 OP 250 PS 637: Performed by: OBSTETRICS & GYNECOLOGY

## 2025-05-01 PROCEDURE — 120N000001 HC R&B MED SURG/OB

## 2025-05-01 PROCEDURE — 36415 COLL VENOUS BLD VENIPUNCTURE: CPT | Performed by: OBSTETRICS & GYNECOLOGY

## 2025-05-01 PROCEDURE — 250N000011 HC RX IP 250 OP 636: Mod: JZ | Performed by: OBSTETRICS & GYNECOLOGY

## 2025-05-01 PROCEDURE — 85018 HEMOGLOBIN: CPT | Performed by: OBSTETRICS & GYNECOLOGY

## 2025-05-01 RX ADMIN — IBUPROFEN 800 MG: 800 TABLET ORAL at 17:59

## 2025-05-01 RX ADMIN — KETOROLAC TROMETHAMINE 15 MG: 15 INJECTION, SOLUTION INTRAMUSCULAR; INTRAVENOUS at 00:43

## 2025-05-01 RX ADMIN — ACETAMINOPHEN 975 MG: 325 TABLET ORAL at 05:32

## 2025-05-01 RX ADMIN — KETOROLAC TROMETHAMINE 15 MG: 15 INJECTION, SOLUTION INTRAMUSCULAR; INTRAVENOUS at 06:27

## 2025-05-01 RX ADMIN — ACETAMINOPHEN 975 MG: 325 TABLET ORAL at 11:34

## 2025-05-01 RX ADMIN — SODIUM CHLORIDE, SODIUM LACTATE, POTASSIUM CHLORIDE, AND CALCIUM CHLORIDE 1000 ML: .6; .31; .03; .02 INJECTION, SOLUTION INTRAVENOUS at 09:10

## 2025-05-01 RX ADMIN — FLUTICASONE PROPIONATE 1 SPRAY: 50 SPRAY, METERED NASAL at 20:35

## 2025-05-01 RX ADMIN — SENNOSIDES AND DOCUSATE SODIUM 1 TABLET: 50; 8.6 TABLET ORAL at 09:01

## 2025-05-01 RX ADMIN — KETOROLAC TROMETHAMINE 15 MG: 15 INJECTION, SOLUTION INTRAMUSCULAR; INTRAVENOUS at 12:01

## 2025-05-01 RX ADMIN — SENNOSIDES AND DOCUSATE SODIUM 2 TABLET: 50; 8.6 TABLET ORAL at 20:35

## 2025-05-01 RX ADMIN — ACETAMINOPHEN 975 MG: 325 TABLET ORAL at 17:56

## 2025-05-01 ASSESSMENT — ACTIVITIES OF DAILY LIVING (ADL)
ADLS_ACUITY_SCORE: 22
ADLS_ACUITY_SCORE: 26
ADLS_ACUITY_SCORE: 24
ADLS_ACUITY_SCORE: 22
ADLS_ACUITY_SCORE: 22
ADLS_ACUITY_SCORE: 24
ADLS_ACUITY_SCORE: 22
ADLS_ACUITY_SCORE: 21
ADLS_ACUITY_SCORE: 24
ADLS_ACUITY_SCORE: 24
ADLS_ACUITY_SCORE: 22
ADLS_ACUITY_SCORE: 17
ADLS_ACUITY_SCORE: 24
ADLS_ACUITY_SCORE: 22
ADLS_ACUITY_SCORE: 21
ADLS_ACUITY_SCORE: 24

## 2025-05-01 NOTE — PLAN OF CARE
Problem: Postpartum ( Delivery)  Goal: Successful Parent Role Transition  Outcome: Progressing  Intervention: Support Parent Role Transition  Recent Flowsheet Documentation  Taken 2025 05 by Valarie Whyte RN  Supportive Measures:   active listening utilized   goal-setting facilitated  Taken 2025 by Valarie Whyte RN  Supportive Measures:   active listening utilized   goal-setting facilitated  Taken 2025 by Valarie Whyte RN  Supportive Measures:   active listening utilized   goal-setting facilitated  Goal: Hemostasis  Outcome: Progressing  Goal: Effective Bowel Elimination  Outcome: Progressing  Intervention: Enhance Bowel Motility and Elimination  Recent Flowsheet Documentation  Taken 2025 05 by Valarie Whyte RN  Bowel Motility Enhancement: fluid intake encouraged  Taken 2025 by Valarie Whyte RN  Bowel Motility Enhancement: fluid intake encouraged  Taken 2025 by Valarie Whyte RN  Bowel Motility Enhancement: fluid intake encouraged  Goal: Fluid and Electrolyte Balance  Outcome: Progressing  Goal: Absence of Infection Signs and Symptoms  Outcome: Progressing  Goal: Optimal Pain Control and Function  Outcome: Progressing  Intervention: Prevent or Manage Pain  Recent Flowsheet Documentation  Taken 2025 05 by Valarie Whyte RN  Pain Management Interventions: around-the-clock dosing utilized  Perineal Care:   absorbent brief/pad changed   perineal hygiene encouraged  Taken 2025 0100 by Valarie Whyte RN  Perineal Care:   absorbent brief/pad changed   perineum cleansed  Taken 2025 2345 by Valarie Whyte RN  Pain Management Interventions: around-the-clock dosing utilized  Goal: Effective Urinary Elimination  Outcome: Progressing  Goal: Effective Oxygenation and Ventilation  Outcome: Progressing   Goal Outcome Evaluation:    Data: Vital signs within normal limits. Postpartum checks within normal limits - see flow  record. Patient eating and drinking normally. Patient able to empty bladder independently and is up ambulating. No apparent signs of infection. Incision covered with dressing is dry and intact.  Patient performing self cares and is able to care for infant with her significant other's help.  Action: Patient medicated around the clock with Toradol and Tylenol during the shift to control her pain. See MAR. Patient reassessed within 1 hour after each medication and pain was controlled.  Response: Positive attachment behaviors observed with infant. Support persons father of baby present.   Plan: Continue to monitor.

## 2025-05-01 NOTE — PLAN OF CARE
Goal Outcome Evaluation:      Plan of Care Reviewed With: patient    Overall Patient Progress: improvingOverall Patient Progress: improving    Outcome Evaluation:  delivery @ 1823. VSS, bleeding is scant to light, firm @ U. Dressing CDI. Formula fed for first feeding, encouraged breastfeeding at next feeding. Selina  used for cares. Denies pre-eclampsia symptoms.     Data: Htee S Paw transferred to [facility] via bed. Patient transferred to Postpartum with .    Action: Receiving unit notified of transfer. Patient and support person notified of room change. Patient and belongings accompanied by Registered Nurse. Bedside report given to Valarie VENEGAS RN. Fundal check performed with receiving RN. Oriented patient to surroundings. Call light within reach.    Response: Patient tolerated transfer.    Radah Caba RN  2025 9:00 PM

## 2025-05-01 NOTE — PROGRESS NOTES
FMOB    Patient was complete. Infant 0 station still. FHT category 1. Afebrile. After pitocin since midnight, little descent from this morning to afternoon, concern for arrest of descent and failure to progress. In house OB consult was placed. See documentation.     Linda Maldonado MD

## 2025-05-01 NOTE — CONSULTS
INITIAL SOCIAL WORK MATERNITY ASSESSMENT     DATA:      Reason for Social Work Consult: Community resource needs     Presenting Information: SW met with pt in her postpartum room utilizing Selina  on the ipad, ID #- [id number]. Pts mom, sister and aunt were also present during assessment. Pts mom assisting with caring for the baby during SW visit.     Living Situation: Pt reports that she lives with her boyfriend and his mom. She reports this is a supportive environment.      Social Support/Professional Community Support:  Pt reports having good support from her boyfriend, his mom, her mom, sister, aunt and other family that live in the area.    Insurance: [facility] PMAP- Discussed how to get baby added to this insurance.      Source of Financial Support: Employment. Pt reports she was working part time prior to delivery and will return to work after taking some time off for the birth of the baby. She reports that her boyfriend is working as well and will need a work note from the doctor to take time off to help her. Pt denies any immediate financial concerns. She reports that she gets WIC. SW informed her of need to call them and get an appointment to bring in baby. She reports understanding. She reports that she applied for SNAP and MFIP last week. She has not heard back yet. SW informed her that it can take some time to hear back. SW offered to make clinic care coordination referral for ongoing support. Pt agreeable.     Baby Supplies: Pt reports having some needed baby supplies. She reports she has a car seat. She requests help with diapers, clothes and blankets. She asks about getting formula. Discussed that WIC can assist with this once she gets in. SW offered to provide food resource packet with [employer] gift card that they can use for formula or other needs as well. Pt appreciative of this. Pt reports she needs a breast pump. Discussed that lactation can provide this before discharge.     Mental Health History:  "Pt denies any significant mental health history. She reports she had some \"depression\" that started during pregnancy but it wasn't bad. She reports that she had worries about doing the right forms and getting benefits for the baby. She denies that this impacted her functioning.      History of Postpartum Mood Disorders: NA        INTERVENTION:      SW completed chart review and collaborated with the multidisciplinary team.   Psychosocial Assessment   Introduction to Maternal Child Health  role and scope of practice   Reviewed Hospital and Community Resources   Assessed Mental Health History and Current Symptoms   Identified stressors, barriers and family concerns   Provided support and active empathetic listening and validation.   Provided psychoeducation on  mood and anxiety disorders, assessed for any current symptoms or history    ASSESSMENT:    Pt open and receptive to meeting with SW today. Family at bedside appear very supportive and involved in pt and baby care and needs. Pt provided all of the information noted above. Pt reports being well supported and reports being prepared for pt at home. As pt noted, she had questions about forms and benefits for the baby. She asks about the social security card and birth certificate. SW provided education on this process. Pt reports understanding.    SW asked if pt working with a public health nurse. Pt confirms that she does have one. SW encouraged her to use her public health nurse for support and to reach out to her with questions if they come up after discharge. Pt reports understanding. Discussed that SW would send discharge summaries for pt and baby to [facility] at discharge. Pt agreeable.     SW engaged pt in discussion about postpartum mood concerns including when to seek help. SW encouraged pt to maintain close communication with her providers. SW also discussed the importance of utilizing her support system while she " recovers and as she transitions into parenthood. SW validated this huge chance and the worries that pt has had. Pt receptive to support and reports understanding of education provided.     PLAN:    SW provided pt with parent resources to utilize as needed. She reports having support that can read the English resources. Pt denies other SW needs or questions at this time.    SW will provide diapers, clothes, blankets and food resource packet. SW sent clinic care coordination referral. SW will send discharge summaries for mom and baby to [facility] at discharge. No additional SW needs identified but SW remains available to see pt again if requested by pt or staff.    GILES Bell

## 2025-05-01 NOTE — LACTATION NOTE
This note was copied from a baby's chart.  Initial Lactation Visit    Current age: 18 hours old    Gestational age at delivery: 38w6d     Diaper count (last 24 hours): 2 wet, 3 soiled      Feedings(last 24 hours): 1 breast  and 5 Formula 20k/razia 5-15mL    Weight Loss: pending 24 hours    Breastfeeding goals:  would like to BF, has been using bottles    Past breastfeeding experience: none/prime    Labor and Delivery Events that may affect breastfeeding: C-Birth    Maternal risk that may affect breastfeeding: Anemia, Hypertension    Home Pump: none/chose Medela to receive before discharge    Breast assessment: Breast: Round, Symmetrical, and Soft , Nipples: Everted, Intact, and Short    Infant oral assessment: Oral: Midline, Elevated, and Cupped, Suck: Moderate    Feeding assessment:     Visit Summary: Ashley planned to breastfeed her baby. She has  once since birth as she had an unexpected C-Birth.  At the initial visit, parents had just fed 15ml of formula.  Will return for the next feeding and encourage skin to skin as able prior to the feeding.  Second visit: Maren woke fussy in his bassinet so father picked his up to begin formula feeding 1.5 hours after previous feeding of 15ml of formula.  RN recommended STS and called me.  Upon entering, I discussed reasons for Maren to be crying such as wanting to be held, diaper change or burped. If Maren is showing feeding cues of rooting, mouthing hands, opening mouth and searching, then he may be hungry.  As Ashley would like to breastfeed, this LC encouraged frequent STS and wait for Maren's feeding cues.  Assisted with a latch in several positions however Maren was fussy and wouldn't latch. He was content STS.    Feeding plan:     Education:   [] [place] Feeding Patterns in the First Few Days/second Night  [] Maternal Risk Factors that Could Affect Milk Supply  [x] Hand Expression  [] Stages of Milk Production  [x] Feeding Cues  [] LPI Feeding Behaviors  [] LBW  Feeding Behaviors  [x] Rousing Techniques  [x] Benefits of Skin-to-Skin   [x] Breastfeeding Positions  [x] Tips to Maintain a Deep Latch     [] Nutritive vs Non-Nutritive Sucking   [] Gentle Breast Compressions  [] [place] Weight Loss  [] How to Know When Baby is Getting Enough to Eat  [x] Supplementation & methods (including Paced Bottle Feeding)  [] Nipple Shield  [] Sore Nipples  [] Pacifier Use  [] Tight Frenulum  [] Breastfeeding Twins  [] Pumping Plan/Flange fit and comfort  [] Breast pump Education  [] Engorgement/Reverse Pressure Softening  [x] Inpatient Lactation Support  [] Outpatient Lactation Resources/Follow up    Follow up: LC will continue to follow up during hospital stay.

## 2025-05-01 NOTE — PROGRESS NOTES
"[facility]  Postpartum Day 1:  for arrest of descent  Pt also has chronic HTN and was on labetalol prior to admission    Subjective:  The patient feels well.  Pain is well controlled with current medications. The patient is ambulating Rarely.  She is voiding and is not passing flatus.The amount and color of the lochia is appropriate for the duration of recovery, patient denies clots. The baby is doing well and is Both breast and formula feeding.     Some lightheadedness and dizziness with ambulation.  She is voiding a lot.  Complains of LLQ to left lateral mid abdominal pain.  Rates it 4/10    Exam:   /58 (BP Location: Right arm)   Pulse 77   Temp 97.8  F (36.6  C) (Oral)   Resp 16   Ht 1.473 m (4' 10\")   Wt 69.9 kg (154 lb)   LMP 2024 (Exact Date)   SpO2 97%   Breastfeeding Unknown   BMI 32.19 kg/m    General: NAD, alert and orientated   Abdomen: soft, NT   Fundus: firm, nontender  Incision: covered, C/D/I  No rebound or guarding but the abdomen is quite distended.  Ext: no pain     Lab Results   Component Value Date    HGB 9.7 (L) 2025     O POS    Impression:   Normal postpartum course, POD#1 LTCS secondary to arrest of discent  Chronic HTN on labetalol prior to admission    Plan:   - switch to oral pain meds  - encourage ambulation   - Continue current care.  -1 liter of fluid given that she is dizzy, acute blood loss anemia  Hg 12.5 to 9.7, will need to go home on oral iron  -hold labetalol for pressures less than 110/60 which she was today, we will recheck blood pressure at noon today    Belinda Alvarez MD 2025 10:40 AM      "

## 2025-05-01 NOTE — ANESTHESIA CARE TRANSFER NOTE
Patient: Ashley Cross    Procedure: Procedure(s):   SECTION       Diagnosis: Arrest of descent, delivered, current hospitalization [O62.1]  Diagnosis Additional Information: No value filed.    Anesthesia Type:   Epidural     Note:    Oropharynx: oropharynx clear of all foreign objects and spontaneously breathing  Level of Consciousness: awake  Oxygen Supplementation: room air    Independent Airway: airway patency satisfactory and stable  Dentition: dentition unchanged  Vital Signs Stable: post-procedure vital signs reviewed and stable  Report to RN Given: handoff report given  Patient transferred to: Labor and Delivery    Handoff Report: Identifed the Patient, Identified the Reponsible Provider, Reviewed the pertinent medical history, Discussed the surgical course, Reviewed Intra-OP anesthesia mangement and issues during anesthesia, Set expectations for post-procedure period and Allowed opportunity for questions and acknowledgement of understanding      Vitals:  Vitals Value Taken Time   /80 25 1933   Temp 36.8  C (98.3  F) 25 1903   Pulse     Resp 16 25 1933   SpO2 98 % 25       Electronically Signed By: LAVINIA Phillips CRNA  2025  8:50 PM

## 2025-05-01 NOTE — ANESTHESIA POSTPROCEDURE EVALUATION
Patient: Ashley Cross    Procedure: Procedure(s):   SECTION       Anesthesia Type:  Epidural    Note:  Disposition: Inpatient   Postop Pain Control: Uneventful            Sign Out: Well controlled pain   PONV: No   Neuro/Psych: Uneventful            Sign Out: Acceptable/Baseline neuro status   Airway/Respiratory: Uneventful            Sign Out: Acceptable/Baseline resp. status   CV/Hemodynamics: Uneventful            Sign Out: Acceptable CV status; No obvious hypovolemia; No obvious fluid overload   Other NRE: NONE   DID A NON-ROUTINE EVENT OCCUR? No           Last vitals:  Vitals:    25   BP: 119/63 123/79 109/69   Pulse:  90 78   Resp:  16 16   Temp:  36.6  C (97.9  F) 36.4  C (97.6  F)   SpO2:  96% 96%       Electronically Signed By: José Miguel Maciel MD  2025  10:32 PM

## 2025-05-01 NOTE — PLAN OF CARE
Goal Outcome Evaluation:      Plan of Care Reviewed With: patient    Overall Patient Progress: improvingOverall Patient Progress: improving       Problem: Adult Inpatient Plan of Care  Goal: Optimal Comfort and Wellbeing  Intervention: Monitor Pain and Promote Comfort  Recent Flowsheet Documentation  Taken 5/1/2025 0808 by Mckayla Rothman RN  Pain Management Interventions: medication offered but refused  Pt is taking scheduled pain medications when due in order to stay on top of pain management. Pt declines prn pain meds at this time, but will let RN know if needed. Pt rating pain a 4 on the 0-10 pain scale. Abdominal binder given per pt request. Pt ambulating to the bathroom with assist of 1. Some dizziness when standing. IV fluids running at this time per order.     1824: Pt states dizziness has resolved since this morning. Pt has some nasal stuffiness which she says is her normal. Pt prefers to take her Flonase in the evening. Pt rating her pain a 6 on the 0-10 pain scale. Pt is taking scheduled ibuprofen and tylenol. Educated on prn oxycodone, pt declines at this time.     1854: Dr Ortiz updated pt's last bp was 93/67 at 1622. Morning dose of labetalol was held per Dr Alvarez's order. Dr Alvarez wrote in her note to hold scheduled labetalol for bp's less than 110/60. Dr Ortiz would like to continue with this.

## 2025-05-01 NOTE — PROGRESS NOTES
D:  Patient admitted to [facility]/[id number] via bed with  and support person father of baby.  A:  Bedside report received from Radha HARKINS RN. Oriented patient and family to surroundings; call light within reach. Fundal assessment checked with reporting RN.  R:  Patient and  tolerated transfer. Care assumed.

## 2025-05-01 NOTE — PROGRESS NOTES
0848: Updated Dr Alvarez pt bp this AM was 101/58 at 0804. Pt scheduled dose of labetalol 200mg scheduled for 9am. Dr Alvarez would not like this dose given. Pt hemoglobin dropped from 12.5 yesterday to 9.7 this AM at 0627. Pt feels some dizziness when standing. Dr Alvarez would like 1 liter of LR IV fluid given at this time. See new order.

## 2025-05-02 VITALS
HEART RATE: 96 BPM | SYSTOLIC BLOOD PRESSURE: 114 MMHG | TEMPERATURE: 98.1 F | DIASTOLIC BLOOD PRESSURE: 76 MMHG | OXYGEN SATURATION: 97 % | RESPIRATION RATE: 18 BRPM | BODY MASS INDEX: 33.33 KG/M2 | WEIGHT: 158.8 LBS | HEIGHT: 58 IN

## 2025-05-02 VITALS
HEIGHT: 58 IN | BODY MASS INDEX: 32.32 KG/M2 | WEIGHT: 154 LBS | SYSTOLIC BLOOD PRESSURE: 115 MMHG | TEMPERATURE: 97.9 F | HEART RATE: 95 BPM | DIASTOLIC BLOOD PRESSURE: 74 MMHG | OXYGEN SATURATION: 97 % | RESPIRATION RATE: 18 BRPM

## 2025-05-02 PROBLEM — Z98.891 S/P PRIMARY LOW TRANSVERSE C-SECTION: Status: ACTIVE | Noted: 2025-05-02

## 2025-05-02 PROBLEM — D62 ABLA (ACUTE BLOOD LOSS ANEMIA): Status: ACTIVE | Noted: 2025-05-02

## 2025-05-02 PROCEDURE — 250N000013 HC RX MED GY IP 250 OP 250 PS 637: Performed by: OBSTETRICS & GYNECOLOGY

## 2025-05-02 RX ORDER — IBUPROFEN 800 MG/1
800 TABLET, FILM COATED ORAL EVERY 8 HOURS PRN
Qty: 60 TABLET | Refills: 0 | Status: SHIPPED | OUTPATIENT
Start: 2025-05-02

## 2025-05-02 RX ORDER — FERROUS SULFATE 325(65) MG
325 TABLET, DELAYED RELEASE (ENTERIC COATED) ORAL DAILY
Qty: 30 TABLET | Refills: 1 | Status: SHIPPED | OUTPATIENT
Start: 2025-05-02

## 2025-05-02 RX ORDER — ACETAMINOPHEN 325 MG/1
975 TABLET ORAL EVERY 6 HOURS
Qty: 180 TABLET | Refills: 0 | Status: SHIPPED | OUTPATIENT
Start: 2025-05-02

## 2025-05-02 RX ORDER — AMOXICILLIN 250 MG
1 CAPSULE ORAL 2 TIMES DAILY
Qty: 60 TABLET | Refills: 0 | Status: SHIPPED | OUTPATIENT
Start: 2025-05-02

## 2025-05-02 RX ORDER — OXYCODONE HYDROCHLORIDE 5 MG/1
5 TABLET ORAL EVERY 4 HOURS PRN
Qty: 20 TABLET | Refills: 0 | Status: SHIPPED | OUTPATIENT
Start: 2025-05-02

## 2025-05-02 RX ADMIN — IBUPROFEN 800 MG: 800 TABLET ORAL at 00:15

## 2025-05-02 RX ADMIN — ACETAMINOPHEN 975 MG: 325 TABLET ORAL at 05:53

## 2025-05-02 RX ADMIN — IBUPROFEN 800 MG: 800 TABLET ORAL at 12:16

## 2025-05-02 RX ADMIN — ACETAMINOPHEN 975 MG: 325 TABLET ORAL at 12:15

## 2025-05-02 RX ADMIN — OXYCODONE HYDROCHLORIDE 5 MG: 5 TABLET ORAL at 08:19

## 2025-05-02 RX ADMIN — ACETAMINOPHEN 975 MG: 325 TABLET ORAL at 00:15

## 2025-05-02 RX ADMIN — IBUPROFEN 800 MG: 800 TABLET ORAL at 05:53

## 2025-05-02 ASSESSMENT — ACTIVITIES OF DAILY LIVING (ADL)
ADLS_ACUITY_SCORE: 21
ADLS_ACUITY_SCORE: 23
ADLS_ACUITY_SCORE: 21
ADLS_ACUITY_SCORE: 23
ADLS_ACUITY_SCORE: 21
ADLS_ACUITY_SCORE: 21

## 2025-05-02 NOTE — LACTATION NOTE
Online :    Lactation distributed mother a Max flow breast pump from [facility].  Mother was instructed on the use and cleaning of the breast pump.  Mother verbalizes understanding of how to use the breast pump.  She was instructed to empty her breasts 8-12 times in 24 hours, either with the baby at the breast or the breast pump, to have optimal milk production for her .

## 2025-05-02 NOTE — DISCHARGE SUMMARY
Discharge Summary    Ashley Cross MRN# [id number]   Age: 19 year old YOB: 2005     Date of Admission:  2025  Date of Discharge::  2025  Admitting Physician:  Linda Maldonado MD  Discharge Physician:  Karmen Cox MD     Home clinic: [facility]          Admission Diagnoses:   Chronic hypertension affecting pregnancy [O10.919]  Intrauterine pregnancy at 38w6d          Discharge Diagnosis:   Same   Acute blood loss anemia, asymptomatic  S/p  delivery  Failure to decend           Procedures:     Procedure(s): Low transverse   delivery via Pfannenstiel incision              Medications Prior to Admission:     Medications Prior to Admission   Medication Sig Dispense Refill Last Dose/Taking    labetalol (NORMODYNE) 200 MG tablet Take 1 tablet (200 mg) by mouth 2 times daily. 60 tablet 2 2025    Prenatal Vit-Fe Fumarate-FA (PRENATAL MULTIVITAMIN W/IRON) 27-0.8 MG tablet Take 1 tablet by mouth daily. 90 tablet 3 2025    [DISCONTINUED] aspirin 81 MG EC tablet Take 1 tablet (81 mg) by mouth daily. 100 tablet 3 2025             Discharge Medications:        Review of your medicines        START taking        Dose / Directions   acetaminophen 325 MG tablet  Commonly known as: TYLENOL      Dose: 975 mg  Take 3 tablets (975 mg) by mouth every 6 hours.  Quantity: 180 tablet  Refills: 0     ferrous sulfate 325 (65 Fe) MG EC tablet  Commonly known as: FE TABS      Dose: 325 mg  Take 1 tablet (325 mg) by mouth daily.  Quantity: 30 tablet  Refills: 1     ibuprofen 800 MG tablet  Commonly known as: ADVIL/MOTRIN      Dose: 800 mg  Take 1 tablet (800 mg) by mouth every 8 hours as needed for moderate pain.  Quantity: 60 tablet  Refills: 0     oxyCODONE 5 MG tablet  Commonly known as: ROXICODONE      Dose: 5 mg  Take 1 tablet (5 mg) by mouth every 4 hours as needed for breakthrough pain or moderate to severe pain.  Quantity: 20 tablet  Refills: 0     senna-docusate  8.6-50 MG tablet  Commonly known as: SENOKOT-S/PERICOLACE      Dose: 1 tablet  Take 1 tablet by mouth 2 times daily.  Quantity: 60 tablet  Refills: 0            CONTINUE these medicines which have NOT CHANGED        Dose / Directions   labetalol 200 MG tablet  Commonly known as: NORMODYNE  Used for: Chronic hypertension      Dose: 200 mg  Take 1 tablet (200 mg) by mouth 2 times daily.  Quantity: 60 tablet  Refills: 2     prenatal multivitamin w/iron 27-0.8 MG tablet  Used for: Encounter for supervision of normal first pregnancy in first trimester      Dose: 1 tablet  Take 1 tablet by mouth daily.  Quantity: 90 tablet  Refills: 3            STOP taking      aspirin 81 MG EC tablet                  Where to get your medicines        These medications were sent to [facility] - [place], [place] - [address] [place], [place] [address]  [address], [place] [address]      Phone: [phone]   acetaminophen 325 MG tablet  ferrous sulfate 325 (65 Fe) MG EC tablet  ibuprofen 800 MG tablet  senna-docusate 8.6-50 MG tablet       Some of these will need a paper prescription and others can be bought over the counter. Ask your nurse if you have questions.    Bring a paper prescription for each of these medications  oxyCODONE 5 MG tablet               Consultations:   [facility]          Brief History of Labor:   Ashley rCoss is a 19 year old  who presented labor.           Hospital Course:   The patient's hospital course was complicated by need for  section and acute blood loss anemia. She recovered as anticipated and experienced no post-operative complications.  On discharge, her pain was well controlled. Vaginal bleeding is similar to peak menstrual flow.  Voiding without difficulty.  Ambulating well and tolerating a normal diet.  No fever or significant wound drainage.  Bottlefeeding.  Infant is stable.  She was discharged on post-partum day #2. She will resume her prenatal, iron, colace, ibu,  "tylenol and oxycodone.     Post-partum hemoglobin:   Hemoglobin   Date Value Ref Range Status   05/01/2025 9.7 (L) 11.7 - 15.7 g/dL Final       Day of discharge assessment:   /76 (BP Location: Left arm)   Pulse 96   Temp 98.1  F (36.7  C) (Oral)   Resp 18   Ht 1.473 m (4' 10\")   Wt 69.9 kg (154 lb)   LMP 08/01/2024 (Exact Date)   SpO2 97%   Breastfeeding Unknown   BMI 32.19 kg/m    Abdomen: Soft, fundus firm, incision clean with bandage          Discharge Instructions and Follow-Up:     Discharge diet: Regular   Discharge activity: Your activity upon discharge: no lifting >15 lbs or strenuous exercise for 6 weeks, no driving for 2 weeks or until you can slam on the breaks w/o pain, nothing in the vagina for 6 weeks (no tampons, intercourse, douching).    Discharge follow-up: Two weeks for incision check with Dr. Muñiz  6 weeks for postpartum visit.   Wound care: Keep incision clean and dry.           Discharge Disposition:     Discharged to home      Attestation:  I have reviewed today's vital signs, notes, medications, labs and imaging.  Amount of time performed on this discharge summary: 20 minutes.  Total time: 45 minutes    Karmen Cox MD           "

## 2025-05-02 NOTE — PLAN OF CARE
Goal Outcome Evaluation:      Plan of Care Reviewed With: patient, family    Overall Patient Progress: improvingOverall Patient Progress: improving    Outcome Evaluation: Patient discharging home this afternoon. VS WNL, BP medications have been held due to BP <120/70. Postpartum checks WNL. Pain well controlled with scheduled Tylenol and Ibuprofen withh oxycodone as needed. Patient showered this shift. voiding well. no concerns

## 2025-05-02 NOTE — PLAN OF CARE
Problem: Postpartum ( Delivery)  Goal: Successful Parent Role Transition  Outcome: Progressing  Intervention: Support Parent Role Transition  Recent Flowsheet Documentation  Taken 2025 by Valarie Whyte RN  Supportive Measures:   active listening utilized   goal-setting facilitated  Parent-Child Attachment Promotion: caring behavior modeled  Taken 2025 by Valarie Whyte RN  Supportive Measures:   active listening utilized   goal-setting facilitated  Parent-Child Attachment Promotion: caring behavior modeled  Goal: Hemostasis  Outcome: Progressing  Goal: Effective Bowel Elimination  Outcome: Progressing  Intervention: Enhance Bowel Motility and Elimination  Recent Flowsheet Documentation  Taken 2025 by Valarie Whyte RN  Bowel Motility Enhancement:   ambulation promoted   fluid intake encouraged  Taken 2025 by Valarie Whyte RN  Bowel Motility Enhancement:   ambulation promoted   fluid intake encouraged  Goal: Absence of Infection Signs and Symptoms  Outcome: Progressing  Goal: Optimal Pain Control and Function  Outcome: Progressing  Intervention: Prevent or Manage Pain  Recent Flowsheet Documentation  Taken 2025 0410 by Valarie Whyte RN  Pain Management Interventions: declines  Taken 2025 by Valarie Whyte RN  Pain Management Interventions: around-the-clock dosing utilized  Goal: Effective Urinary Elimination  Outcome: Progressing   Goal Outcome Evaluation:    Data: Vital signs within normal limits. Postpartum checks within normal limits - see flow record. Patient eating and drinking normally. Patient able to empty bladder independently and is up ambulating. No apparent signs of infection. Incision covered with dressing is dry and intact.  Patient performing self cares with the assistance of her significant other.  Father of baby has done all cares thru the night.    Action: Patient medicated with around the clock dosing of Tylenol and  Ibuprofen during the shift for pain. See MAR. Patient reassessed within 1 hour after each medication and pain was under control.  Response: Positive attachment behaviors observed with infant. Support persons father of baby present.   Plan: Continue to monitor.

## 2025-05-02 NOTE — PROGRESS NOTES
"[facility]  Postpartum Day 2:     Subjective:  The patient feels well.   Pain is well controlled with current medications. The patient is ambulating Often.  She is voiding and is passing flatus.  She has had a bowel movement. .The amount and color of the lochia is appropriate for the duration of recovery, patient denies clots. The baby is doing well and is formula feeding.   She would like to go home today and baby is cleared for discharge. She has lots of help at home!       Exam:   /76 (BP Location: Left arm)   Pulse 96   Temp 98.1  F (36.7  C) (Oral)   Resp 18   Ht 1.473 m (4' 10\")   Wt 69.9 kg (154 lb)   LMP 2024 (Exact Date)   SpO2 97%   Breastfeeding Unknown   BMI 32.19 kg/m    General: NAD  Abdomen: soft, NT  Fundus: firm, nontender  Incision: C/D/I  Ext: no pain     Impression:   POD#2, LTCS secondary to arrest of decent.   Chronic hypertension  ABLA    Plan:   - Continue current care.  - Doing well  - She wants to discharge home today.  - iron, colace, ibu, tylenol and oxycodone prescriptions sent.  - she has blood pressure cuff and labetalol at home. Discussed checking blood pressures before taking meds and not to take if <120 or <70.  - Home health visit ordered  - Follow-up with Dr Muñiz in 2 weeks for post-op check  - She has follow-up with PCP next week.        Karmen Cox MD 2025 9:07 AM        "

## 2025-05-02 NOTE — DISCHARGE INSTRUCTIONS
Warning Signs after Having a Baby    Keep this paper on your fridge or somewhere else where you can see it.    Call your provider if you have any of these symptoms up to 12 weeks after having your baby.    Thoughts of hurting yourself or your baby  Pain in your chest or trouble breathing  Severe headache not helped by pain medicine  Eyesight concerns (blurry vision, seeing spots or flashes of light, other changes to eyesight)  Fainting, shaking or other signs of a seizure    Call 9-1-1 if you feel that it is an emergency.     The symptoms below can happen to anyone after giving birth. They can be very serious. Call your provider if you have any of these warning signs.    My provider s phone number: _______________________    Losing too much blood (hemorrhage)    Call your provider if you soak through a pad in less than an hour or pass blood clots bigger than a golf ball. These may be signs that you are bleeding too much.    Blood clots in the legs or lungs    After you give birth, your body naturally clots its blood to help prevent blood loss. Sometimes this increased clotting can happen in other areas of the body, like the legs or lungs. This can block your blood flow and be very dangerous.     Call your provider if you:  Have a red, swollen spot on the back of your leg that is warm or painful when you touch it.   Are coughing up blood.     Infection    Call your provider if you have any of these symptoms:  Fever of 100.4 F (38 C) or higher.  Pain or redness around your stitches if you had an incision.   Any yellow, white, or green fluid coming from places where you had stitches or surgery.    Mood Problems (postpartum depression)    Many people feel sad or have mood changes after having a baby. But for some people, these mood swings are worse.     Call your provider right away if you feel so anxious or nervous that you can't care for yourself or your baby.    Preeclampsia (high blood pressure)    Even if you  didn't have high blood pressure when you were pregnant, you are at risk for the high blood pressure disease called preeclampsia. This risk can last up to 12 weeks after giving birth.     Call your provider if you have:   Pain on your right side under your rib cage  Sudden swelling in the hands and face    Remember: You know your body. If something doesn't feel right, get medical help.     For informational purposes only. Not to replace the advice of your health care provider. Copyright 2020 [facility]. All rights reserved. Clinically reviewed by Patty Canales, RNC-OB, MSN. [employer] [id number] - Rev 02/23.                  Please Check Blood Pressure before taking Labetalol and keep log   Do no take if BP <120/70        Home Health Nurse Check  Nursing agency will call to set up check for at home within 3-5 days for Blood Pressure check  Please Call [phone], if you do not hear from them within 24 hours    Post- Birth check with Dr. Maldonado  [address] [place] [address]  [phone]  May 7th 2:40pm      2 week Incision Check at [facility]  [address]  [place] [address]  Dr. Muñiz Tuesday May 13th 10 am      6 week Postpartum check with Dr. Maldonado  June 25th 2:40PM

## 2025-05-03 ENCOUNTER — MEDICAL CORRESPONDENCE (OUTPATIENT)
Dept: HEALTH INFORMATION MANAGEMENT | Facility: CLINIC | Age: 20
End: 2025-05-03

## 2025-05-05 ENCOUNTER — PATIENT OUTREACH (OUTPATIENT)
Dept: CARE COORDINATION | Facility: CLINIC | Age: 20
End: 2025-05-05
Payer: COMMERCIAL

## 2025-05-05 NOTE — PROGRESS NOTES
Clinic Care Coordination Contact  Transitions of Care Outreach  Chief Complaint   Patient presents with    Clinic Care Coordination - Post Hospital       Most Recent Admission Date: 2025   Most Recent Admission Diagnosis: Chronic hypertension affecting pregnancy - O10.919     Most Recent Discharge Date: 2025   Most Recent Discharge Diagnosis: Encounter for triage in pregnant patient - Z36.89  S/P primary low transverse  - Z98.891     Transitions of Care Assessment    Discharge Assessment  How are your symptoms? (Red Flag symptoms escalate to triage hotline per guidelines): Improved              Care Management     Spoke with pt   Pt would like support with getting  insurance  OPen program with  pt and will follow    Care Mgmt General Assessment                         Follow up Plan          Future Appointments   Date Time Provider Department Center   2025  3:00 PM Linda Maldonado MD [facility]   2025  3:00 PM Linda Maldonado MD [facility]   2025  3:00 PM Linda Maldonado MD [facility]       Outpatient Plan as outlined on AVS reviewed with patient.    For any urgent concerns, please contact our 24 hour nurse triage line: [phone] ([phone])       Emily Draper RN

## 2025-05-07 ENCOUNTER — OFFICE VISIT (OUTPATIENT)
Dept: FAMILY MEDICINE | Facility: CLINIC | Age: 20
End: 2025-05-07
Payer: COMMERCIAL

## 2025-05-07 VITALS
TEMPERATURE: 97 F | BODY MASS INDEX: 32.15 KG/M2 | HEIGHT: 57 IN | HEART RATE: 78 BPM | OXYGEN SATURATION: 99 % | RESPIRATION RATE: 18 BRPM | WEIGHT: 149 LBS | SYSTOLIC BLOOD PRESSURE: 132 MMHG | DIASTOLIC BLOOD PRESSURE: 96 MMHG

## 2025-05-07 DIAGNOSIS — Z98.891 S/P PRIMARY LOW TRANSVERSE C-SECTION: ICD-10-CM

## 2025-05-07 DIAGNOSIS — I10 CHRONIC HYPERTENSION: Primary | ICD-10-CM

## 2025-05-07 DIAGNOSIS — O09.90 SUPERVISION OF HIGH RISK PREGNANCY, ANTEPARTUM: ICD-10-CM

## 2025-05-07 LAB
ALBUMIN UR-MCNC: ABNORMAL MG/DL
GLUCOSE UR STRIP-MCNC: NEGATIVE MG/DL
KETONES UR STRIP-MCNC: ABNORMAL MG/DL

## 2025-05-07 PROCEDURE — 3075F SYST BP GE 130 - 139MM HG: CPT | Performed by: FAMILY MEDICINE

## 2025-05-07 PROCEDURE — 3080F DIAST BP >= 90 MM HG: CPT | Performed by: FAMILY MEDICINE

## 2025-05-07 PROCEDURE — G2211 COMPLEX E/M VISIT ADD ON: HCPCS | Performed by: FAMILY MEDICINE

## 2025-05-07 PROCEDURE — 99213 OFFICE O/P EST LOW 20 MIN: CPT | Performed by: FAMILY MEDICINE

## 2025-05-07 NOTE — PROGRESS NOTES
"  Assessment & Plan     (I10) Chronic hypertension  (primary encounter diagnosis    (O09.90) Supervision of high risk pregnancy, antepartum    (Z98.891) S/P primary low transverse   EHR reviewed.   Past medical history, problem list, past surgical history, family history, social history, medications reviewed, updated, reconciled.   Blood pressure range during labor was normal. Uncontrolled today.   Did not take medications today. Encouraged compliance, low salt diet. Has monitor at home. We reviewed the usual range of BP and will call me with this in two weeks. Advised on reasons to call or be seen.     BMI  Estimated body mass index is 32.15 kg/m  as calculated from the following:    Height as of this encounter: 1.45 m (4' 9.09\").    Weight as of this encounter: 67.6 kg (149 lb).             Aidee Ramirez is a 19 year old, presenting for the following health issues:  Blood Pressure Check      2025     2:53 PM   Additional Questions   Roomed by Valentin MOTA   Accompanied by baby and boyfriend     [facility]        Hospital Follow-up Visit:    Hospital/Nursing Home/IP Rehab Facility: [facility]  Most Recent Admission Date: 2025   Most Recent Admission Diagnosis: Chronic hypertension affecting pregnancy - O10.919     Most Recent Discharge Date: 2025   Most Recent Discharge Diagnosis: Encounter for triage in pregnant patient - Z36.89  S/P primary low transverse  - Z98.891   Was the patient in the ICU or did the patient experience delirium during hospitalization?  No  Do you have any other stressors you would like to discuss with your provider? No    Problems taking medications regularly:  None  Medication changes since discharge: None  Problems adhering to non-medication therapy:  None    Summary of hospitalization:  [facility] discharge summary reviewed  Diagnostic Tests/Treatments reviewed.  Follow up needed: none  Other Healthcare Providers Involved " "in Patient s Care:         None  Update since discharge: improved.         Plan of care communicated with patient                 Objective    BP (!) 130/94 (BP Location: Left arm, Patient Position: Sitting, Cuff Size: Adult Regular)   Pulse 78   Temp 97  F (36.1  C) (Temporal)   Resp 18   Ht 1.45 m (4' 9.09\")   Wt 67.6 kg (149 lb)   LMP 08/01/2024 (Exact Date)   SpO2 99%   Breastfeeding No   BMI 32.15 kg/m    Body mass index is 32.15 kg/m .  Physical Exam   GENERAL: alert and no distress  EYES: Eyes grossly normal to inspection, PERRL and conjunctivae and sclerae normal  NECK: no adenopathy, no asymmetry, masses, or scars  RESP: lungs clear to auscultation - no rales, rhonchi or wheezes  CV: regular rate and rhythm, normal S1 S2, no S3 or S4, no murmur, click or rub, no peripheral edema  MS: no gross musculoskeletal defects noted, no edema  NEURO: Normal strength and tone, mentation intact and speech normal    Results for orders placed or performed in visit on 04/16/25 (from the past 24 hours)   Urinalysis, OB Screen   Result Value Ref Range    Glucose Urine Negative Negative mg/dL    Ketones Urine Trace (A) Negative mg/dL    Protein Albumin Urine Trace (A) Negative mg/dL           Signed Electronically by: Linda Maldonado MD    "

## 2025-05-07 NOTE — PROGRESS NOTES
Prior to immunization administration, verified patients identity using patient s name and date of birth. Please see Immunization Activity for additional information.     Screening Questionnaire for Adult Immunization    Are you sick today?   No   Do you have allergies to medications, food, a vaccine component or latex?   No   Have you ever had a serious reaction after receiving a vaccination?   No   Do you have a long-term health problem with heart, lung, kidney, or metabolic disease (e.g., diabetes), asthma, a blood disorder, no spleen, complement component deficiency, a cochlear implant, or a spinal fluid leak?  Are you on long-term aspirin therapy?   No   Do you have cancer, leukemia, HIV/AIDS, or any other immune system problem?   No   Do you have a parent, brother, or sister with an immune system problem?   No   In the past 3 months, have you taken medications that affect  your immune system, such as prednisone, other steroids, or anticancer drugs; drugs for the treatment of rheumatoid arthritis, Crohn s disease, or psoriasis; or have you had radiation treatments?   No   Have you had a seizure, or a brain or other nervous system problem?   No   During the past year, have you received a transfusion of blood or blood    products, or been given immune (gamma) globulin or antiviral drug?   No   For women: Are you pregnant or is there a chance you could become       pregnant during the next month?   No   Have you received any vaccinations in the past 4 weeks?   No     Immunization questionnaire answers were all negative.      Patient instructed to remain in clinic for 15 minutes afterwards, and to report any adverse reactions.     Screening performed by Valentin Fitch MA on 5/7/2025 at 2:53 PM.

## 2025-05-13 ENCOUNTER — HOSPITAL ENCOUNTER (INPATIENT)
Facility: HOSPITAL | Age: 20
LOS: 1 days | Discharge: HOME | End: 2025-05-14
Attending: STUDENT IN AN ORGANIZED HEALTH CARE EDUCATION/TRAINING PROGRAM | Admitting: STUDENT IN AN ORGANIZED HEALTH CARE EDUCATION/TRAINING PROGRAM
Payer: COMMERCIAL

## 2025-05-13 ENCOUNTER — HOSPITAL ENCOUNTER (EMERGENCY)
Facility: HOSPITAL | Age: 20
Discharge: OTHER INSTITUTION | End: 2025-05-13
Payer: COMMERCIAL

## 2025-05-13 VITALS
WEIGHT: 144.9 LBS | RESPIRATION RATE: 20 BRPM | SYSTOLIC BLOOD PRESSURE: 144 MMHG | OXYGEN SATURATION: 97 % | BODY MASS INDEX: 29.21 KG/M2 | DIASTOLIC BLOOD PRESSURE: 89 MMHG | HEART RATE: 68 BPM | HEIGHT: 59 IN | TEMPERATURE: 99.1 F

## 2025-05-13 DIAGNOSIS — O14.93 PRE-ECLAMPSIA IN THIRD TRIMESTER: Primary | ICD-10-CM

## 2025-05-13 DIAGNOSIS — I10 CHRONIC HYPERTENSION: ICD-10-CM

## 2025-05-13 PROBLEM — O14.90 PRE-ECLAMPSIA: Status: ACTIVE | Noted: 2025-05-13

## 2025-05-13 LAB
ALBUMIN SERPL BCG-MCNC: 3.9 G/DL (ref 3.5–5.2)
ALP SERPL-CCNC: 137 U/L (ref 40–150)
ALT SERPL W P-5'-P-CCNC: 12 U/L (ref 0–50)
ANION GAP SERPL CALCULATED.3IONS-SCNC: 11 MMOL/L (ref 7–15)
AST SERPL W P-5'-P-CCNC: 17 U/L (ref 0–35)
BILIRUB SERPL-MCNC: 0.3 MG/DL
BUN SERPL-MCNC: 6.7 MG/DL (ref 6–20)
CALCIUM SERPL-MCNC: 9.2 MG/DL (ref 8.8–10.4)
CHLORIDE SERPL-SCNC: 107 MMOL/L (ref 98–107)
CREAT SERPL-MCNC: 0.86 MG/DL (ref 0.51–0.95)
EGFRCR SERPLBLD CKD-EPI 2021: >90 ML/MIN/1.73M2
ERYTHROCYTE [DISTWIDTH] IN BLOOD BY AUTOMATED COUNT: 14.5 % (ref 10–15)
GLUCOSE SERPL-MCNC: 82 MG/DL (ref 70–99)
HCO3 SERPL-SCNC: 22 MMOL/L (ref 22–29)
HCT VFR BLD AUTO: 31.1 % (ref 35–47)
HGB BLD-MCNC: 10.4 G/DL (ref 11.7–15.7)
HOLD SPECIMEN: NORMAL
MCH RBC QN AUTO: 27 PG (ref 26.5–33)
MCHC RBC AUTO-ENTMCNC: 33.4 G/DL (ref 31.5–36.5)
MCV RBC AUTO: 81 FL (ref 78–100)
PLATELET # BLD AUTO: 469 10E3/UL (ref 150–450)
POTASSIUM SERPL-SCNC: 3.6 MMOL/L (ref 3.4–5.3)
PROT SERPL-MCNC: 7.3 G/DL (ref 6.4–8.3)
RBC # BLD AUTO: 3.85 10E6/UL (ref 3.8–5.2)
SODIUM SERPL-SCNC: 140 MMOL/L (ref 135–145)
WBC # BLD AUTO: 10.1 10E3/UL (ref 4–11)

## 2025-05-13 PROCEDURE — 999N000285 HC STATISTIC VASC ACCESS LAB DRAW WITH PIV START

## 2025-05-13 PROCEDURE — 258N000003 HC RX IP 258 OP 636: Performed by: STUDENT IN AN ORGANIZED HEALTH CARE EDUCATION/TRAINING PROGRAM

## 2025-05-13 PROCEDURE — 250N000013 HC RX MED GY IP 250 OP 250 PS 637: Performed by: STUDENT IN AN ORGANIZED HEALTH CARE EDUCATION/TRAINING PROGRAM

## 2025-05-13 PROCEDURE — 250N000011 HC RX IP 250 OP 636: Performed by: STUDENT IN AN ORGANIZED HEALTH CARE EDUCATION/TRAINING PROGRAM

## 2025-05-13 PROCEDURE — 80051 ELECTROLYTE PANEL: CPT | Performed by: STUDENT IN AN ORGANIZED HEALTH CARE EDUCATION/TRAINING PROGRAM

## 2025-05-13 PROCEDURE — 36415 COLL VENOUS BLD VENIPUNCTURE: CPT | Performed by: STUDENT IN AN ORGANIZED HEALTH CARE EDUCATION/TRAINING PROGRAM

## 2025-05-13 PROCEDURE — 999N000248 HC STATISTIC IV INSERT WITH US BY RN

## 2025-05-13 PROCEDURE — 120N000001 HC R&B MED SURG/OB

## 2025-05-13 PROCEDURE — 999N000104 HC STATISTIC NO CHARGE

## 2025-05-13 PROCEDURE — 85014 HEMATOCRIT: CPT | Performed by: STUDENT IN AN ORGANIZED HEALTH CARE EDUCATION/TRAINING PROGRAM

## 2025-05-13 RX ORDER — SODIUM PHOSPHATE,MONO-DIBASIC 19G-7G/118
1 ENEMA (ML) RECTAL DAILY PRN
OUTPATIENT
Start: 2025-05-15

## 2025-05-13 RX ORDER — LIDOCAINE 40 MG/G
CREAM TOPICAL
Status: DISCONTINUED | OUTPATIENT
Start: 2025-05-13 | End: 2025-05-14 | Stop reason: HOSPADM

## 2025-05-13 RX ORDER — BISACODYL 10 MG
10 SUPPOSITORY, RECTAL RECTAL DAILY PRN
OUTPATIENT
Start: 2025-05-15

## 2025-05-13 RX ORDER — METOCLOPRAMIDE HYDROCHLORIDE 5 MG/ML
10 INJECTION INTRAMUSCULAR; INTRAVENOUS EVERY 6 HOURS PRN
OUTPATIENT
Start: 2025-05-13

## 2025-05-13 RX ORDER — LIDOCAINE 40 MG/G
CREAM TOPICAL
OUTPATIENT
Start: 2025-05-13

## 2025-05-13 RX ORDER — MAGNESIUM SULFATE HEPTAHYDRATE 40 MG/ML
2 INJECTION, SOLUTION INTRAVENOUS
Status: DISCONTINUED | OUTPATIENT
Start: 2025-05-13 | End: 2025-05-14

## 2025-05-13 RX ORDER — ONDANSETRON 2 MG/ML
4 INJECTION INTRAMUSCULAR; INTRAVENOUS EVERY 6 HOURS PRN
OUTPATIENT
Start: 2025-05-13

## 2025-05-13 RX ORDER — ACETAMINOPHEN 325 MG/1
975 TABLET ORAL EVERY 6 HOURS
OUTPATIENT
Start: 2025-05-13

## 2025-05-13 RX ORDER — OXYCODONE HYDROCHLORIDE 5 MG/1
5 TABLET ORAL EVERY 4 HOURS PRN
Refills: 0 | OUTPATIENT
Start: 2025-05-13

## 2025-05-13 RX ORDER — LOPERAMIDE HYDROCHLORIDE 2 MG/1
4 CAPSULE ORAL
OUTPATIENT
Start: 2025-05-13

## 2025-05-13 RX ORDER — LABETALOL 200 MG/1
200 TABLET, FILM COATED ORAL EVERY 8 HOURS SCHEDULED
Status: DISCONTINUED | OUTPATIENT
Start: 2025-05-13 | End: 2025-05-14 | Stop reason: HOSPADM

## 2025-05-13 RX ORDER — MISOPROSTOL 200 UG/1
400 TABLET ORAL
OUTPATIENT
Start: 2025-05-13

## 2025-05-13 RX ORDER — OXYTOCIN 10 [USP'U]/ML
10 INJECTION, SOLUTION INTRAMUSCULAR; INTRAVENOUS
OUTPATIENT
Start: 2025-05-13

## 2025-05-13 RX ORDER — DEXTROSE, SODIUM CHLORIDE, SODIUM LACTATE, POTASSIUM CHLORIDE, AND CALCIUM CHLORIDE 5; .6; .31; .03; .02 G/100ML; G/100ML; G/100ML; G/100ML; G/100ML
INJECTION, SOLUTION INTRAVENOUS CONTINUOUS
OUTPATIENT
Start: 2025-05-13

## 2025-05-13 RX ORDER — MISOPROSTOL 200 UG/1
800 TABLET ORAL
OUTPATIENT
Start: 2025-05-13

## 2025-05-13 RX ORDER — MAGNESIUM SULFATE 4 G/50ML
4 INJECTION INTRAVENOUS
Status: DISCONTINUED | OUTPATIENT
Start: 2025-05-13 | End: 2025-05-14 | Stop reason: HOSPADM

## 2025-05-13 RX ORDER — PROCHLORPERAZINE MALEATE 10 MG
10 TABLET ORAL EVERY 6 HOURS PRN
OUTPATIENT
Start: 2025-05-13

## 2025-05-13 RX ORDER — HYDROCORTISONE 25 MG/G
CREAM TOPICAL 3 TIMES DAILY PRN
OUTPATIENT
Start: 2025-05-13

## 2025-05-13 RX ORDER — CARBOPROST TROMETHAMINE 250 UG/ML
250 INJECTION, SOLUTION INTRAMUSCULAR
OUTPATIENT
Start: 2025-05-13

## 2025-05-13 RX ORDER — AMOXICILLIN 250 MG
2 CAPSULE ORAL 2 TIMES DAILY
OUTPATIENT
Start: 2025-05-13

## 2025-05-13 RX ORDER — SODIUM CHLORIDE, SODIUM LACTATE, POTASSIUM CHLORIDE, CALCIUM CHLORIDE 600; 310; 30; 20 MG/100ML; MG/100ML; MG/100ML; MG/100ML
10-125 INJECTION, SOLUTION INTRAVENOUS CONTINUOUS
Status: DISCONTINUED | OUTPATIENT
Start: 2025-05-13 | End: 2025-05-14 | Stop reason: HOSPADM

## 2025-05-13 RX ORDER — TRANEXAMIC ACID 10 MG/ML
1 INJECTION, SOLUTION INTRAVENOUS EVERY 30 MIN PRN
OUTPATIENT
Start: 2025-05-13

## 2025-05-13 RX ORDER — MAGNESIUM SULFATE 4 G/50ML
4 INJECTION INTRAVENOUS ONCE
Status: COMPLETED | OUTPATIENT
Start: 2025-05-13 | End: 2025-05-13

## 2025-05-13 RX ORDER — CALCIUM GLUCONATE 98 MG/ML
1 INJECTION, SOLUTION INTRAVENOUS
Status: DISCONTINUED | OUTPATIENT
Start: 2025-05-13 | End: 2025-05-14 | Stop reason: HOSPADM

## 2025-05-13 RX ORDER — ACETAMINOPHEN 500 MG
1000 TABLET ORAL EVERY 6 HOURS PRN
Status: DISCONTINUED | OUTPATIENT
Start: 2025-05-13 | End: 2025-05-14 | Stop reason: HOSPADM

## 2025-05-13 RX ORDER — LABETALOL HYDROCHLORIDE 5 MG/ML
20-80 INJECTION, SOLUTION INTRAVENOUS EVERY 10 MIN PRN
Status: DISCONTINUED | OUTPATIENT
Start: 2025-05-13 | End: 2025-05-14 | Stop reason: HOSPADM

## 2025-05-13 RX ORDER — KETOROLAC TROMETHAMINE 15 MG/ML
15 INJECTION, SOLUTION INTRAMUSCULAR; INTRAVENOUS EVERY 6 HOURS
OUTPATIENT
Start: 2025-05-13 | End: 2025-05-14

## 2025-05-13 RX ORDER — ONDANSETRON 4 MG/1
4 TABLET, ORALLY DISINTEGRATING ORAL EVERY 6 HOURS PRN
OUTPATIENT
Start: 2025-05-13

## 2025-05-13 RX ORDER — LOPERAMIDE HYDROCHLORIDE 2 MG/1
2 CAPSULE ORAL
OUTPATIENT
Start: 2025-05-13

## 2025-05-13 RX ORDER — OXYTOCIN/0.9 % SODIUM CHLORIDE 30/500 ML
340 PLASTIC BAG, INJECTION (ML) INTRAVENOUS CONTINUOUS PRN
OUTPATIENT
Start: 2025-05-13

## 2025-05-13 RX ORDER — MAGNESIUM SULFATE IN WATER 40 MG/ML
2 INJECTION, SOLUTION INTRAVENOUS CONTINUOUS
Status: DISCONTINUED | OUTPATIENT
Start: 2025-05-13 | End: 2025-05-14 | Stop reason: HOSPADM

## 2025-05-13 RX ORDER — METOCLOPRAMIDE 10 MG/1
10 TABLET ORAL EVERY 6 HOURS PRN
OUTPATIENT
Start: 2025-05-13

## 2025-05-13 RX ORDER — SIMETHICONE 80 MG
80 TABLET,CHEWABLE ORAL 4 TIMES DAILY PRN
OUTPATIENT
Start: 2025-05-13

## 2025-05-13 RX ORDER — IBUPROFEN 800 MG/1
800 TABLET, FILM COATED ORAL EVERY 6 HOURS
OUTPATIENT
Start: 2025-05-14

## 2025-05-13 RX ORDER — AMOXICILLIN 250 MG
1 CAPSULE ORAL 2 TIMES DAILY
OUTPATIENT
Start: 2025-05-13

## 2025-05-13 RX ORDER — HYDRALAZINE HYDROCHLORIDE 20 MG/ML
10 INJECTION INTRAMUSCULAR; INTRAVENOUS
Status: DISCONTINUED | OUTPATIENT
Start: 2025-05-13 | End: 2025-05-14 | Stop reason: HOSPADM

## 2025-05-13 RX ORDER — METHYLERGONOVINE MALEATE 0.2 MG/ML
200 INJECTION INTRAVENOUS
OUTPATIENT
Start: 2025-05-13

## 2025-05-13 RX ADMIN — ACETAMINOPHEN 1000 MG: 500 TABLET, FILM COATED ORAL at 19:41

## 2025-05-13 RX ADMIN — LABETALOL HYDROCHLORIDE 200 MG: 200 TABLET ORAL at 21:26

## 2025-05-13 RX ADMIN — LABETALOL HYDROCHLORIDE 200 MG: 200 TABLET ORAL at 14:23

## 2025-05-13 RX ADMIN — MAGNESIUM SULFATE HEPTAHYDRATE 4 G: 80 INJECTION, SOLUTION INTRAVENOUS at 12:15

## 2025-05-13 RX ADMIN — MAGNESIUM SULFATE HEPTAHYDRATE 2 G/HR: 40 INJECTION, SOLUTION INTRAVENOUS at 12:48

## 2025-05-13 RX ADMIN — SODIUM CHLORIDE, SODIUM LACTATE, POTASSIUM CHLORIDE, AND CALCIUM CHLORIDE 50 ML/HR: .6; .31; .03; .02 INJECTION, SOLUTION INTRAVENOUS at 12:20

## 2025-05-13 ASSESSMENT — ACTIVITIES OF DAILY LIVING (ADL)
ADLS_ACUITY_SCORE: 26
ADLS_ACUITY_SCORE: 23
ADLS_ACUITY_SCORE: 22
ADLS_ACUITY_SCORE: 26
ADLS_ACUITY_SCORE: 49
ADLS_ACUITY_SCORE: 26

## 2025-05-13 ASSESSMENT — COLUMBIA-SUICIDE SEVERITY RATING SCALE - C-SSRS
6. HAVE YOU EVER DONE ANYTHING, STARTED TO DO ANYTHING, OR PREPARED TO DO ANYTHING TO END YOUR LIFE?: NO
2. HAVE YOU ACTUALLY HAD ANY THOUGHTS OF KILLING YOURSELF IN THE PAST MONTH?: NO
1. IN THE PAST MONTH, HAVE YOU WISHED YOU WERE DEAD OR WISHED YOU COULD GO TO SLEEP AND NOT WAKE UP?: NO

## 2025-05-13 NOTE — H&P
Date: 2025  Time: 12:30 PM    Admission H&P  Ashley Cross  2005, MRN [id number]    PCP: Linda Maldonado, [phone]   Code status:  Prior              Chief Complaint: Postpartum pre-eclampsia     OBSTETRICAL / DATING HISTORY:      OB History    Para Term  AB Living   1 1 1 0 0 1   SAB IAB Ectopic Multiple Live Births   0 0 0 0 1      # Outcome Date GA Lbr Leonides/2nd Weight Sex Type Anes PTL Lv   1 Term 25 38w6d / 02:55 3.58 kg (7 lb 14.3 oz) M CS-LTranv EPI N SERAFIN      Name: Maren Rose      Apgar1: 8  Apgar5: 9       HPI:    Ashley Cross is a 19 year old year old who is 2 weeks post-op, s/p pLTCS on 2025.  She was seen in clinic today with Dr Muñiz for her post-op visit, found to have severe-range BP despite being on oral labetalol, in clinic she had also reported a HA.  She presented as a direct admit from clinic due to post-partum pre-eclampsia with severe features.    On arrival, she reports resolution of her HA.  BP in mild-range.  Pain has been well-controlled.    OB Hx: pLTCS on 2025    Medical History  Past Medical History:   Diagnosis Date    Delayed delivery after SROM (spontaneous rupture of membranes) 2025    Head lice 10/13/2015    Hypertension     Varicella        Surgical History  Past Surgical History:   Procedure Laterality Date     SECTION N/A 2025    Procedure:  SECTION;  Surgeon: Radha Muñiz MD;  Location: [facility]       Social History  Social History     Socioeconomic History    Marital status: Single     Spouse name: Not on file    Number of children: Not on file    Years of education: Not on file    Highest education level: Not on file   Occupational History    Not on file   Tobacco Use    Smoking status: Never     Passive exposure: Never    Smokeless tobacco: Never   Vaping Use    Vaping status: Never Used   Substance and Sexual Activity    Alcohol use: Never    Drug use: Never    Sexual activity: Yes     Partners:  Male     Birth control/protection: None   Other Topics Concern    Not on file   Social History Narrative    Parents: MoWeih & SayKuWah  Siblings:  2011 Kwe Gagan Eh Michele  2015 Say Kpaw Moo  2021 Eh Paw Ter     Social Drivers of Health     Financial Resource Strain: High Risk (5/13/2025)    Financial Resource Strain     Within the past 12 months, have you or your family members you live with been unable to get utilities (heat, electricity) when it was really needed?: Yes   Food Insecurity: Low Risk  (5/13/2025)    Food Insecurity     Within the past 12 months, did you worry that your food would run out before you got money to buy more?: No     Within the past 12 months, did the food you bought just not last and you didn t have money to get more?: No   Transportation Needs: Low Risk  (5/13/2025)    Transportation Needs     Within the past 12 months, has lack of transportation kept you from medical appointments, getting your medicines, non-medical meetings or appointments, work, or from getting things that you need?: No   Physical Activity: Insufficiently Active (1/19/2022)    Exercise Vital Sign     Days of Exercise per Week: 2 days     Minutes of Exercise per Session: 30 min   Stress: Not on file   Social Connections: Not on file   Interpersonal Safety: Low Risk  (5/13/2025)    Interpersonal Safety     Do you feel physically and emotionally safe where you currently live?: Yes     Within the past 12 months, have you been hit, slapped, kicked or otherwise physically hurt by someone?: No     Within the past 12 months, have you been humiliated or emotionally abused in other ways by your partner or ex-partner?: No   Housing Stability: Low Risk  (5/13/2025)    Housing Stability     Do you have housing? : Yes     Are you worried about losing your housing?: No       No Known Allergies    Medications Prior to Admission   Medication Sig Dispense Refill Last Dose/Taking    acetaminophen (TYLENOL) 325 MG tablet Take 3 tablets (295  mg) by mouth every 6 hours. 180 tablet 0     ferrous sulfate (FE TABS) 325 (65 Fe) MG EC tablet Take 1 tablet (325 mg) by mouth daily. 30 tablet 1     ibuprofen (ADVIL/MOTRIN) 800 MG tablet Take 1 tablet (800 mg) by mouth every 8 hours as needed for moderate pain. 60 tablet 0     labetalol (NORMODYNE) 200 MG tablet Take 1 tablet (200 mg) by mouth 2 times daily. 60 tablet 2     oxyCODONE (ROXICODONE) 5 MG tablet Take 1 tablet (5 mg) by mouth every 4 hours as needed for breakthrough pain or moderate to severe pain. 20 tablet 0     Prenatal Vit-Fe Fumarate-FA (PRENATAL MULTIVITAMIN W/IRON) 27-0.8 MG tablet Take 1 tablet by mouth daily. 90 tablet 3     senna-docusate (SENOKOT-S/PERICOLACE) 8.6-50 MG tablet Take 1 tablet by mouth 2 times daily. 60 tablet 0        Review of Systems:  Pertinent items are noted in HPI.    Physical Exam:  Temp:  [99.1  F (37.3  C)] 99.1  F (37.3  C)  Pulse:  [68] 68  Resp:  [20] 20  BP: (138-144)/(89-93) 141/93  SpO2:  [97 %] 97 %    BP (!) 141/93   LMP 08/01/2024 (Exact Date)   General: NAD  Neuro: brisk reflexes    Pertinent Labs  Admission on 04/29/2025, Discharged on 05/02/2025   Component Date Value Ref Range Status    WBC Count 04/29/2025 14.0 (H)  4.0 - 11.0 10e3/uL Final    RBC Count 04/29/2025 5.16  3.80 - 5.20 10e6/uL Final    Hemoglobin 04/29/2025 13.8  11.7 - 15.7 g/dL Final    Hematocrit 04/29/2025 41.5  35.0 - 47.0 % Final    MCV 04/29/2025 80  78 - 100 fL Final    MCH 04/29/2025 26.7  26.5 - 33.0 pg Final    MCHC 04/29/2025 33.3  31.5 - 36.5 g/dL Final    RDW 04/29/2025 15.4 (H)  10.0 - 15.0 % Final    Platelet Count 04/29/2025 344  150 - 450 10e3/uL Final    Treponema Antibody Total 04/29/2025 Nonreactive  Nonreactive Final    Sodium 04/29/2025 136  135 - 145 mmol/L Final    Potassium 04/29/2025 4.0  3.4 - 5.3 mmol/L Final    Carbon Dioxide (CO2) 04/29/2025 22  22 - 29 mmol/L Final    Anion Gap 04/29/2025 9  7 - 15 mmol/L Final    Urea Nitrogen 04/29/2025 7.9  6.0 - 20.0  mg/dL Final    Creatinine 04/29/2025 0.73  0.51 - 0.95 mg/dL Final    GFR Estimate 04/29/2025 >90  >60 mL/min/1.73m2 Final    eGFR calculated using 2021 CKD-EPI equation.    Calcium 04/29/2025 9.4  8.8 - 10.4 mg/dL Final    Chloride 04/29/2025 105  98 - 107 mmol/L Final    Glucose 04/29/2025 73  70 - 99 mg/dL Final    Alkaline Phosphatase 04/29/2025 240 (H)  40 - 150 U/L Final    AST 04/29/2025 17  0 - 35 U/L Final    ALT 04/29/2025 15  0 - 50 U/L Final    Protein Total 04/29/2025 7.9  6.4 - 8.3 g/dL Final    Albumin 04/29/2025 3.9  3.5 - 5.2 g/dL Final    Bilirubin Total 04/29/2025 0.4  <=1.2 mg/dL Final    Total Protein Urine mg/dL 04/29/2025 29.8    mg/dL Final    The reference ranges have not been established in urine protein. The results should be integrated into the clinical context for interpretation.    Total Protein Urine mg/mg Creat 04/29/2025 0.27 (H)  0.00 - 0.20 mg/mg Cr Final    Creatinine Urine mg/dL 04/29/2025 111.2  mg/dL Final    The reference ranges have not been established in urine creatinine. The results should be integrated into the clinical context for interpretation.    ABO/RH(D) 04/29/2025 O POS   Final    Antibody Screen 04/29/2025 Negative  Negative Final    SPECIMEN EXPIRATION DATE 04/29/2025 [id number]   Final    Hemoglobin 04/30/2025 12.5  11.7 - 15.7 g/dL Final    Hemoglobin 05/01/2025 9.7 (L)  11.7 - 15.7 g/dL Final   Prenatal Office Visit on 04/23/2025   Component Date Value Ref Range Status    Glucose Urine 04/23/2025 Negative  Negative mg/dL Final    Ketones Urine 04/23/2025 Negative  Negative mg/dL Final    Protein Albumin Urine 04/23/2025 Trace (A)  Negative mg/dL Final   Prenatal Office Visit on 04/16/2025   Component Date Value Ref Range Status    Glucose Urine 05/07/2025 Negative  Negative mg/dL Final    Ketones Urine 05/07/2025 Trace (A)  Negative mg/dL Final    Protein Albumin Urine 05/07/2025 Trace (A)  Negative mg/dL Final    Group B Strep PCR 04/16/2025 Negative   Negative Final    Presumed negative for Streptococcus agalactiae (Group B Streptococcus) or the number of organisms may be below the limit of detection of the assay.   Prenatal Office Visit on 03/05/2025   Component Date Value Ref Range Status    Glucose Urine 03/05/2025 Negative  Negative mg/dL Final    Ketones Urine 03/05/2025 Negative  Negative mg/dL Final    Protein Albumin Urine 03/05/2025 30 (A)  Negative mg/dL Final   Lab on 02/10/2025   Component Date Value Ref Range Status    Gestational GTT Fasting 02/10/2025 75  60 - 94 mg/dL Final    Gestational GTT 1 Hr Post Dose 02/10/2025 138  60 - 179 mg/dL Final    Gestational GTT 2 Hr Post Dose 02/10/2025 111  60 - 154 mg/dL Final    Gestational GTT 3 Hr Post Dose 02/10/2025 98  60 - 139 mg/dL Final   Prenatal Office Visit on 02/05/2025   Component Date Value Ref Range Status    Glucose Urine 02/05/2025 Negative  Negative mg/dL Final    Ketones Urine 02/05/2025 Trace (A)  Negative mg/dL Final    Protein Albumin Urine 02/05/2025 Negative  Negative mg/dL Final    Glu Gest Screen 1hr 50g 02/05/2025 138 (H)  70 - 129 mg/dL Final    Hemoglobin 02/05/2025 11.8  11.7 - 15.7 g/dL Final    Treponema Antibody Total 02/05/2025 Nonreactive  Nonreactive Final   Prenatal Office Visit on 01/07/2025   Component Date Value Ref Range Status    Glucose Urine 01/07/2025 Negative  Negative mg/dL Final    Ketones Urine 01/07/2025 Negative  Negative mg/dL Final    Protein Albumin Urine 01/07/2025 Negative  Negative mg/dL Final   Prenatal Office Visit on 12/04/2024   Component Date Value Ref Range Status    Glucose Urine 12/04/2024 Negative  Negative mg/dL Final    Ketones Urine 12/04/2024 Negative  Negative mg/dL Final    Protein Albumin Urine 12/04/2024 Trace (A)  Negative mg/dL Final   Prenatal Office Visit on 10/24/2024   Component Date Value Ref Range Status    Glucose Urine 10/24/2024 Negative  Negative mg/dL Final    Ketones Urine 10/24/2024 Negative  Negative mg/dL Final     Protein Albumin Urine 10/24/2024 Negative  Negative mg/dL Final   Allied Health/Nurse Visit on 10/11/2024   Component Date Value Ref Range Status    hCG Urine Qualitative 10/11/2024 Positive (A)  Negative Final    This test is for screening purposes.  Results should be interpreted along with the clinical picture.  Confirmation testing is available if warranted by ordering [id number], HCG Quantitative Pregnancy.    Chlamydia Trachomatis 10/11/2024 Negative  Negative Final    Negative for C. trachomatis rRNA by transcription mediated amplification.   A negative result by transcription mediated amplification does not preclude the presence of infection because results are dependent on proper and adequate collection, absence of inhibitors and sufficient rRNA to be detected.    Neisseria gonorrhoeae 10/11/2024 Negative  Negative Final    Negative for N. gonorrhoeae rRNA by transcription mediated amplification. A negative result by transcription mediated amplification does not preclude the presence of C. trachomatis infection because results are dependent on proper and adequate collection, absence of inhibitors and sufficient rRNA to be detected.    Hepatitis B Surface Antigen 10/11/2024 Nonreactive  Nonreactive Final    WBC Count 10/11/2024 16.2 (H)  4.0 - 11.0 10e3/uL Final    RBC Count 10/11/2024 5.27 (H)  3.80 - 5.20 10e6/uL Final    Hemoglobin 10/11/2024 13.6  11.7 - 15.7 g/dL Final    Hematocrit 10/11/2024 39.9  35.0 - 47.0 % Final    MCV 10/11/2024 76 (L)  78 - 100 fL Final    MCH 10/11/2024 25.8 (L)  26.5 - 33.0 pg Final    MCHC 10/11/2024 34.1  31.5 - 36.5 g/dL Final    RDW 10/11/2024 14.1  10.0 - 15.0 % Final    Platelet Count 10/11/2024 374  150 - 450 10e3/uL Final    HIV Antigen Antibody Combo 10/11/2024 Nonreactive  Nonreactive Final    Negative HIV-1 p24 antigen and HIV-1/2 antibody screening test results usually indicate the absence of HIV-1 and HIV-2 infection. However, such negative results do not rule-out  "acute HIV infection.  If acute HIV-1 or HIV-2 infection is suspected, detection of HIV-1 or HIV-2 RNA  is recommended. This result is obtained using the Roche Elecsys HIV Duo method on the jaguar [id number] immunoassay analyzer.    Rubella Sofia IgG Instrument Value 10/11/2024 5.53  <0.90 Index Final    Rubella Antibody IgG 10/11/2024 Positive   Final    Suggests previous exposure or immunization and probable immunity.    Treponema Antibody Total 10/11/2024 Nonreactive  Nonreactive Final    Lead Venous Blood 10/11/2024 <2.0  <=4.9 ug/dL Final    Comment: INTERPRETIVE INFORMATION: Lead, Blood (Venous)    Analysis performed by Inductively Coupled Plasma-Mass   Spectrometry (ICP-MS).    Elevated results may be due to skin or collection-related   contamination, including the use of a noncertified   lead-free tube. If contamination concerns exist due to   elevated levels of blood lead, confirmation with a second   specimen collected in a certified lead-free tube is   recommended.    Information sources for blood lead reference intervals and   interpretive comments include the CDC's \"Childhood Lead   Poisoning Prevention: Recommended Actions Based on Blood   Lead Level\" and the \"Adult Blood Lead Epidemiology and   Surveillance: Reference Blood Lead Levels (BLLs) for Adults   in the [place].\" Thresholds and time intervals for retesting,   medical evaluation, and response vary by state and   regulatory body. Contact your State Department of Health   and/or applicable regulatory agency for specific guidance   on medical management                            recommendations.    This test was developed and its performance characteristics   determined by [facility]. It has not been cleared or   approved by the U.S. Food and Drug Administration. This   test was performed in a CLIA-certified laboratory and is   intended for clinical purposes.         Group          Concentration   Comment    Children       3.5-19.9 ug/dL  Children " under the age of 6                                 years are the most vulnerable                                 to the harmful effects of                                  lead exposure. Environmental                                  investigation and exposure                                  history to identify potential                                 sources of lead. Biological                                  and nutritional monitoring                                 are recommended. Follow-up                                  blood lead monitoring is                                  recommended.                                                           20-44.9 ug/dL   Lead hazard reduction and                                  prompt medical evaluation are                                 recommended. Contact a                                  Pediatric Environmental                                  Health Specialty Unit or                                  poison control center for                                  guidance.                   Greater than    Critical. Immediate medical                  44.9 ug/dL      evaluation, including                                  detailed neurological exam is                                 recommended. Consider                                  chelation therapy when                                 symptoms of lead toxicity   are                                  present. Contact a Pediatric                                  Environmental Health                                  Specialty Unit or poison                                  control center for                                                             assistance.    Adult          5-19.9 ug/dL    Medical removal is                                  recommended for pregnant                                  women or those who are trying                                 or may become pregnant.                                   Adverse health effects are                                  possible. Reduced lead                                  exposure and increased blood                                  lead monitoring are                                  recommended.                    20-69.9 ug/dL   Adverse health effects are                                  indicated. Medical removal                                  from lead exposure is                                  required by OSHA if blood                                  lead level exceeds 50 ug/dL.                                 Prompt medical evaluation is                                 recommended.                    Greater than    Critical. Immediate medical                                             69.9 ug/dL      evaluation is recommended.                                  Consider chelation therapy                                 when symptoms of lead                                  toxicity are present.  Performed By: [facility]  [address] [place] [id number]  : Mookie Beltrán MD, PhD  CLIA Number: [id number]    Estimated Average Glucose 10/11/2024 111  <117 mg/dL Final    Hemoglobin A1C 10/11/2024 5.5  0.0 - 5.6 % Final    Normal <5.7%   Prediabetes 5.7-6.4%    Diabetes 6.5% or higher     Note: Adopted from ADA consensus guidelines.    Hepatitis C Antibody 10/11/2024 Nonreactive  Nonreactive Final    A nonreactive screening test result does not exclude the possibility of exposure to or infection with HCV. Nonreactive screening test results in individuals with prior exposure to HCV may be due to antibody levels below the limit of detection of this assay or lack of reactivity to the HCV antigens used in this assay. Patients with recent HCV infections (<3 months from time of exposure) may have false-negative HCV antibody results due to the time needed for seroconversion (average of 8 to 9 weeks).    ABO/RH(D) 10/11/2024  O POS   Final    Antibody Screen 10/11/2024 Negative  Negative Final    SPECIMEN EXPIRATION DATE 10/11/2024 [id number]   Final    Culture 10/11/2024 50,000-100,000 CFU/mL Mixture of Urogenital Jennifer   Final    Glucose Urine 10/11/2024 Negative  Negative mg/dL Final    Ketones Urine 10/11/2024 Negative  Negative mg/dL Final    Protein Albumin Urine 10/11/2024 Negative  Negative mg/dL Final   There may be more visits with results that are not included.       Pertinent Radiology:       Impression/Plan:  1. Postpartum pre-eclampsia with severe features  -Patient presented as a direct admit from clinic for magnesium due to postpartum pre-eclampsia with severe features  -Severe-range BP in clinic despite being on oral labetalol  -Plan magnesium 4 gram bolus and 2 grams/hour x 24 hours  -Will increase labetalol to 200 mg q 8 hours (from BID)  -Labs ordered and pending    Provider: Krystal Monzon MD    Date: 2025  Time: 12:30 PM    RANDALL Monaco 2005, MRN [id number]

## 2025-05-13 NOTE — PLAN OF CARE
Goal Outcome Evaluation:      Plan of Care Reviewed With: patient    Overall Patient Progress: improving    Outcome Evaluation: Patient's vital signs are within normal limits. MD aware of elevated blood pressures. Patient reports she took her labetalol and iron supplement this morning prior to arriving to clinic. Magnesium 4g loading dose started at 1220. Patient denies pre-eclampsia symptoms. +2 patellar reflexes. No clonus. Incision has steri-strips. Denies pain. Ambulates independently. Patient is formula feeding infant; infant at home with family.

## 2025-05-13 NOTE — ED TRIAGE NOTES
Patient went to appointment across the street at OB for incision check. It was noted in clinic that patient was hypertensive and clinic stated to come here for blood pressure management and IV. No complaints of headache nor visual changes.      Triage Assessment (Adult)       Row Name 05/13/25 1054          Triage Assessment    Airway WDL WDL        Respiratory WDL    Respiratory WDL WDL        Skin Circulation/Temperature WDL    Skin Circulation/Temperature WDL WDL        Cardiac WDL    Cardiac WDL WDL        Peripheral/Neurovascular WDL    Peripheral Neurovascular WDL WDL        Cognitive/Neuro/Behavioral WDL    Cognitive/Neuro/Behavioral WDL WDL        [place] Coma Scale    Best Eye Response 4-->(E4) spontaneous     Best Motor Response 6-->(M6) obeys commands     Best Verbal Response 5-->(V5) oriented     Andres Coma Scale Score 15

## 2025-05-13 NOTE — PROGRESS NOTES
D:  Patient admitted to [facility]/RJRN85-0 via ambulation with support person Law oRse.   A:  Oriented patient and family to surroundings; call light within reach.   R:  Patient settled in room with significant other. Care assumed.

## 2025-05-14 VITALS
BODY MASS INDEX: 28.42 KG/M2 | SYSTOLIC BLOOD PRESSURE: 138 MMHG | HEART RATE: 81 BPM | DIASTOLIC BLOOD PRESSURE: 88 MMHG | TEMPERATURE: 98 F | OXYGEN SATURATION: 99 % | RESPIRATION RATE: 18 BRPM | WEIGHT: 140.7 LBS

## 2025-05-14 PROBLEM — O14.10 PREECLAMPSIA, SEVERE: Status: ACTIVE | Noted: 2025-05-14

## 2025-05-14 LAB
HOLD SPECIMEN: NORMAL
HOLD SPECIMEN: NORMAL
MAGNESIUM SERPL-MCNC: 7.7 MG/DL (ref 1.7–2.3)
MAGNESIUM SERPL-MCNC: 8.7 MG/DL (ref 1.7–2.3)

## 2025-05-14 PROCEDURE — 36415 COLL VENOUS BLD VENIPUNCTURE: CPT | Performed by: STUDENT IN AN ORGANIZED HEALTH CARE EDUCATION/TRAINING PROGRAM

## 2025-05-14 PROCEDURE — 83735 ASSAY OF MAGNESIUM: CPT | Performed by: STUDENT IN AN ORGANIZED HEALTH CARE EDUCATION/TRAINING PROGRAM

## 2025-05-14 PROCEDURE — 250N000013 HC RX MED GY IP 250 OP 250 PS 637: Performed by: STUDENT IN AN ORGANIZED HEALTH CARE EDUCATION/TRAINING PROGRAM

## 2025-05-14 RX ORDER — LABETALOL 300 MG/1
300 TABLET, FILM COATED ORAL 2 TIMES DAILY
Qty: 120 TABLET | Refills: 0 | Status: SHIPPED | OUTPATIENT
Start: 2025-05-14

## 2025-05-14 RX ADMIN — LABETALOL HYDROCHLORIDE 200 MG: 200 TABLET ORAL at 06:22

## 2025-05-14 ASSESSMENT — ACTIVITIES OF DAILY LIVING (ADL)
ADLS_ACUITY_SCORE: 26

## 2025-05-14 NOTE — PLAN OF CARE
Problem: Adult Inpatient Plan of Care  Goal: Plan of Care Review  Description: The Plan of Care Review/Shift note should be completed every shift.  The Outcome Evaluation is a brief statement about your assessment that the patient is improving, declining, or no change.  This information will be displayed automatically on your shiftnote.  Outcome: Progressing   Goal Outcome Evaluation:       Patient did well overnight. Complains of some dizziness. Denies headache, vision changes or epigastric pain. Updated Dr. Monzon this morning on mag level and patient dizziness and new orders were received.     Judy Urrutia RN 6:44 AM 05/14/25

## 2025-05-14 NOTE — PLAN OF CARE
Goal Outcome Evaluation:      Plan of Care Reviewed With: patient    Overall Patient Progress: improving    Outcome Evaluation: Patient's vital signs are within normal limits. Patient ambulates independently. Voiding spontaneously. Denies pre-eclampsia symptoms. No clonus. +1 patellar reflexes. Incision has steri-strips. Denies pain. Mag shut off at 1000. RN scheduled follow up check at [facility] on 5/19 at 1030. Planning afternoon discharge.

## 2025-05-14 NOTE — PROGRESS NOTES
"Patient called and needed to use the bathroom. Patient was steady on her feet upon standing from bed and walking into bathroom. Patient was on commode and became dizzy upon standing up and partner assisted patient as he was helping her in the bathroom, holding under both of her arms. Patient stated that she was fine and just \"felt dizzy from the medicine.\" Stat mag level ordered due to patient increasing dizziness.     Judy Urrutia RN 4:19 AM 05/14/25   "

## 2025-05-14 NOTE — PLAN OF CARE
Problem: Hypertension Acute  Goal: Blood Pressure Within Desired Range  Intervention: Normalize Blood Pressure  Recent Flowsheet Documentation  Taken 5/13/2025 1522 by Zaynab Mcpherson, RN  Sensory Stimulation Regulation: care clustered     Goal Outcome Evaluation: BP's below severe range. Patient reports intermittent headache that has resolved with tylenol. Denies vision changes, RUQ pain, nausea/vomiting. No swelling, normal reflexes and absent clonus. Eating and drinking well. Voiding without difficulty. Patient states she is feeling dizzy from the mag infusion, but is steady on her feet with standby assistance.       Plan of Care Reviewed With: patient    Overall Patient Progress: improvingOverall Patient Progress: improving

## 2025-05-14 NOTE — PROGRESS NOTES
Updated Dr. Monzon on patient dizziness and current Mag level. Order from Dr. Monzon to turn down magnesium continuous rate to 1 gram/hour and recheck a magnesium level in 2 hours.     Judy Urrutia RN 6:37 AM 05/14/25

## 2025-05-14 NOTE — DISCHARGE INSTRUCTIONS
Warning Signs after Having a Baby    Keep this paper on your fridge or somewhere else where you can see it.    Call your provider if you have any of these symptoms up to 12 weeks after having your baby.    Thoughts of hurting yourself or your baby  Pain in your chest or trouble breathing  Severe headache not helped by pain medicine  Eyesight concerns (blurry vision, seeing spots or flashes of light, other changes to eyesight)  Fainting, shaking or other signs of a seizure    Call 9-1-1 if you feel that it is an emergency.     The symptoms below can happen to anyone after giving birth. They can be very serious. Call your provider if you have any of these warning signs.    My provider s phone number: _______________________    Losing too much blood (hemorrhage)    Call your provider if you soak through a pad in less than an hour or pass blood clots bigger than a golf ball. These may be signs that you are bleeding too much.    Blood clots in the legs or lungs    After you give birth, your body naturally clots its blood to help prevent blood loss. Sometimes this increased clotting can happen in other areas of the body, like the legs or lungs. This can block your blood flow and be very dangerous.     Call your provider if you:  Have a red, swollen spot on the back of your leg that is warm or painful when you touch it.   Are coughing up blood.     Infection    Call your provider if you have any of these symptoms:  Fever of 100.4 F (38 C) or higher.  Pain or redness around your stitches if you had an incision.   Any yellow, white, or green fluid coming from places where you had stitches or surgery.    Mood Problems (postpartum depression)    Many people feel sad or have mood changes after having a baby. But for some people, these mood swings are worse.     Call your provider right away if you feel so anxious or nervous that you can't care for yourself or your baby.    Preeclampsia (high blood pressure)    Even if you  didn't have high blood pressure when you were pregnant, you are at risk for the high blood pressure disease called preeclampsia. This risk can last up to 12 weeks after giving birth.     Call your provider if you have:   Pain on your right side under your rib cage  Sudden swelling in the hands and face    Remember: You know your body. If something doesn't feel right, get medical help.     For informational purposes only. Not to replace the advice of your health care provider. Copyright 2020 [facility]. All rights reserved. Clinically reviewed by Patty Canales, RNC-OB, MSN. [employer] 497107 - Rev 02/23.

## 2025-05-14 NOTE — DISCHARGE SUMMARY
Discharge Summary    Ashley Cross MRN# [id number]   Age: 19 year old YOB: 2005     Date of Admission:  2025  Date of Discharge::  2025  1:15 PM  Admitting Physician:  Krystal Monzon MD  Discharge Physician:  Katherine Knowles MD     Home clinic: [facility]          Admission Diagnoses:   Pre-eclampsia [O14.90]  Postpartum preeclampsia with severe features            Discharge Diagnosis:   Same             Procedures:     none             Medications Prior to Admission:     Medications Prior to Admission   Medication Sig Dispense Refill Last Dose/Taking    acetaminophen (TYLENOL) 325 MG tablet Take 3 tablets (975 mg) by mouth every 6 hours. 180 tablet 0     ferrous sulfate (FE TABS) 325 (65 Fe) MG EC tablet Take 1 tablet (325 mg) by mouth daily. 30 tablet 1     ibuprofen (ADVIL/MOTRIN) 800 MG tablet Take 1 tablet (800 mg) by mouth every 8 hours as needed for moderate pain. 60 tablet 0     oxyCODONE (ROXICODONE) 5 MG tablet Take 1 tablet (5 mg) by mouth every 4 hours as needed for breakthrough pain or moderate to severe pain. 20 tablet 0     Prenatal Vit-Fe Fumarate-FA (PRENATAL MULTIVITAMIN W/IRON) 27-0.8 MG tablet Take 1 tablet by mouth daily. 90 tablet 3     senna-docusate (SENOKOT-S/PERICOLACE) 8.6-50 MG tablet Take 1 tablet by mouth 2 times daily. 60 tablet 0     [DISCONTINUED] labetalol (NORMODYNE) 200 MG tablet Take 1 tablet (200 mg) by mouth 2 times daily. 60 tablet 2              Discharge Medications:        Review of your medicines        CONTINUE these medicines which may have CHANGED, or have new prescriptions. If we are uncertain of the size of tablets/capsules you have at home, strength may be listed as something that might have changed.        Dose / Directions   labetalol 300 MG tablet  Commonly known as: NORMODYNE  This may have changed:   medication strength  how much to take  Used for: Chronic hypertension      Dose: 300 mg  Take 1 tablet  (300 mg) by mouth 2 times daily.  Quantity: 120 tablet  Refills: 0            CONTINUE these medicines which have NOT CHANGED        Dose / Directions   acetaminophen 325 MG tablet  Commonly known as: TYLENOL  Used for: S/P primary low transverse       Dose: 975 mg  Take 3 tablets (975 mg) by mouth every 6 hours.  Quantity: 180 tablet  Refills: 0     ferrous sulfate 325 (65 Fe) MG EC tablet  Commonly known as: FE TABS  Used for: S/P primary low transverse       Dose: 325 mg  Take 1 tablet (325 mg) by mouth daily.  Quantity: 30 tablet  Refills: 1     ibuprofen 800 MG tablet  Commonly known as: ADVIL/MOTRIN  Used for: S/P primary low transverse       Dose: 800 mg  Take 1 tablet (800 mg) by mouth every 8 hours as needed for moderate pain.  Quantity: 60 tablet  Refills: 0     oxyCODONE 5 MG tablet  Commonly known as: ROXICODONE  Used for: S/P primary low transverse       Dose: 5 mg  Take 1 tablet (5 mg) by mouth every 4 hours as needed for breakthrough pain or moderate to severe pain.  Quantity: 20 tablet  Refills: 0     prenatal multivitamin w/iron 27-0.8 MG tablet  Used for: Encounter for supervision of normal first pregnancy in first trimester      Dose: 1 tablet  Take 1 tablet by mouth daily.  Quantity: 90 tablet  Refills: 3     senna-docusate 8.6-50 MG tablet  Commonly known as: SENOKOT-S/PERICOLACE  Used for: S/P primary low transverse       Dose: 1 tablet  Take 1 tablet by mouth 2 times daily.  Quantity: 60 tablet  Refills: 0               Where to get your medicines        These medications were sent to [facility] [address] [address]      Phone: [phone]   labetalol 300 MG tablet               Consultations:   No consultations were requested during this admission          Brief History    Ashley Cross is a 19 year old  who presented to the clinic with headache and  was diagnoses with preeclampsia with severe features. She was started on magnesium for seizure prophylaxis.           Hospital Course:   The patient was on magnesium just under 24 hours because she had an elevated magnesium level, so the magnesium was titrated off.  Blood pressures were well controlled on 200 TID of labetalol. Her headache resolved and she was discharged home    Post-partum hemoglobin:   Hemoglobin   Date Value Ref Range Status   05/13/2025 10.4 (L) 11.7 - 15.7 g/dL Final       Day of discharge assessment:   /88   Pulse 81   Temp 98  F (36.7  C) (Oral)   Resp 18   Wt 63.8 kg (140 lb 11.2 oz)   LMP 08/01/2024 (Exact Date)   SpO2 99%   BMI 28.42 kg/m    Gen; PT Doing well, no concerns          Discharge Instructions and Follow-Up:     Discharge diet: Regular   Discharge activity: Same as postpartum   Discharge follow-up: For BP check next week          Changed dosing of labetalol to 300 BID as with TID dosing pt was more likely to miss a dose.       Discharge Disposition:     Discharged to home      Attestation:  I have reviewed today's vital signs, notes, medications, labs and imaging.  Amount of time performed on this discharge summary: 20 minutes.  Total time: 30 minutes    Katherine Knowles MD

## 2025-05-15 ENCOUNTER — PATIENT OUTREACH (OUTPATIENT)
Dept: CARE COORDINATION | Facility: CLINIC | Age: 20
End: 2025-05-15
Payer: COMMERCIAL

## 2025-05-15 NOTE — PROGRESS NOTES
"  [facility] [place]: Transitions of Care Outreach  Chief Complaint   Patient presents with    Clinic Care Coordination - Post Hospital       Most Recent Admission Date: 5/13/2025   Most Recent Admission Diagnosis: Pre-eclampsia - O14.90     Most Recent Discharge Date: 5/14/2025   Most Recent Discharge Diagnosis: Pre-eclampsia in third trimester - O14.93  Chronic hypertension - I10     Transitions of Care Assessment    Discharge Assessment  How are you doing now that you are home?: Patient states she is doing well. Patient states she is checking her blood pressures at home. Last blood pressure was 103/65. Patient states this is a more \"normal\" blood pressure for her so she does not have any symptoms of lightheadedness. Denies any symtpoms, feeling well.  How are your symptoms? (Red Flag symptoms escalate to triage hotline per guidelines): Improved  Do you know how to contact your clinic care team if you have future questions or changes to your health status? : Yes  Does the patient have their discharge instructions? : Yes  Does the patient have questions regarding their discharge instructions? : No  Were you started on any new medications or were there changes to any of your previous medications? : Yes  Does the patient have all of their medications?: Yes  Do you have questions regarding any of your medications? : No  Do you have all of your needed medical supplies or equipment (DME)?  (i.e. oxygen tank, CPAP, cane, etc.): Yes         Post-op (Clinicians Only)  Did the patient have surgery or a procedure: No  Fever: No  Chills: No  Eating & Drinking: eating and drinking without complaints/concerns  PO Intake: regular diet  Additional Symptoms:  (Denies)    [facility] Explained and offered Care Coordination support to eligible patients: Yes    Patient accepted? No    Follow up Plan     Discharge Follow-Up  Discharge follow up appointment scheduled in alignment with recommended follow up timeframe or Transitions " of Risk Category? (Low = within 30 days; Moderate= within 14 days; High= within 7 days): Yes  Discharge Follow Up Appointment Date: 05/19/25  Discharge Follow Up Appointment Scheduled with?: Specialty Care Provider    Future Appointments   Date Time Provider Department Center   6/25/2025  3:00 PM Linda Maldonado MD [facility]   7/21/2025  2:30 PM Mary Ellen Meredith PA-C [facility]       Outpatient Plan as outlined on AVS reviewed with patient.    For any urgent concerns, please contact our 24 hour nurse triage line: [phone] ([phone])       Renetta Muir RN

## 2025-06-25 ENCOUNTER — PRENATAL OFFICE VISIT (OUTPATIENT)
Dept: FAMILY MEDICINE | Facility: CLINIC | Age: 20
End: 2025-06-25
Payer: COMMERCIAL

## 2025-06-25 ENCOUNTER — OFFICE VISIT (OUTPATIENT)
Dept: FAMILY MEDICINE | Facility: CLINIC | Age: 20
End: 2025-06-25
Payer: COMMERCIAL

## 2025-06-25 VITALS
RESPIRATION RATE: 16 BRPM | HEART RATE: 98 BPM | DIASTOLIC BLOOD PRESSURE: 84 MMHG | SYSTOLIC BLOOD PRESSURE: 122 MMHG | OXYGEN SATURATION: 98 % | TEMPERATURE: 98 F | HEIGHT: 57 IN | BODY MASS INDEX: 30.42 KG/M2 | WEIGHT: 141 LBS

## 2025-06-25 VITALS
WEIGHT: 141 LBS | DIASTOLIC BLOOD PRESSURE: 84 MMHG | TEMPERATURE: 98 F | SYSTOLIC BLOOD PRESSURE: 122 MMHG | BODY MASS INDEX: 30.42 KG/M2 | HEART RATE: 98 BPM | RESPIRATION RATE: 16 BRPM | HEIGHT: 57 IN | OXYGEN SATURATION: 98 %

## 2025-06-25 DIAGNOSIS — Z30.017 INSERTION OF IMPLANTABLE SUBDERMAL CONTRACEPTIVE: Primary | ICD-10-CM

## 2025-06-25 DIAGNOSIS — I10 CHRONIC HYPERTENSION: ICD-10-CM

## 2025-06-25 DIAGNOSIS — Z98.891 S/P PRIMARY LOW TRANSVERSE C-SECTION: ICD-10-CM

## 2025-06-25 LAB — HCG UR QL: NEGATIVE

## 2025-06-25 PROCEDURE — 3079F DIAST BP 80-89 MM HG: CPT | Performed by: FAMILY MEDICINE

## 2025-06-25 PROCEDURE — 3074F SYST BP LT 130 MM HG: CPT | Performed by: FAMILY MEDICINE

## 2025-06-25 PROCEDURE — 11981 INSERTION DRUG DLVR IMPLANT: CPT | Performed by: FAMILY MEDICINE

## 2025-06-25 PROCEDURE — 0503F POSTPARTUM CARE VISIT: CPT | Performed by: FAMILY MEDICINE

## 2025-06-25 PROCEDURE — 81025 URINE PREGNANCY TEST: CPT | Performed by: FAMILY MEDICINE

## 2025-06-25 RX ORDER — FLUTICASONE PROPIONATE 50 MCG
1 SPRAY, SUSPENSION (ML) NASAL DAILY
COMMUNITY

## 2025-06-25 SDOH — HEALTH STABILITY: PHYSICAL HEALTH: ON AVERAGE, HOW MANY DAYS PER WEEK DO YOU ENGAGE IN MODERATE TO STRENUOUS EXERCISE (LIKE A BRISK WALK)?: 4 DAYS

## 2025-06-25 SDOH — HEALTH STABILITY: PHYSICAL HEALTH: ON AVERAGE, HOW MANY MINUTES DO YOU ENGAGE IN EXERCISE AT THIS LEVEL?: 30 MIN

## 2025-06-25 ASSESSMENT — EDINBURGH POSTNATAL DEPRESSION SCALE (EPDS)
TOTAL SCORE: 1
I HAVE BLAMED MYSELF UNNECESSARILY WHEN THINGS WENT WRONG: NO, NEVER
I HAVE BEEN SO UNHAPPY THAT I HAVE BEEN CRYING: NO, NEVER
I HAVE BEEN SO UNHAPPY THAT I HAVE HAD DIFFICULTY SLEEPING: NOT AT ALL
I HAVE BEEN ANXIOUS OR WORRIED FOR NO GOOD REASON: NO, NOT AT ALL
I HAVE BEEN ABLE TO LAUGH AND SEE THE FUNNY SIDE OF THINGS: AS MUCH AS I ALWAYS COULD
THE THOUGHT OF HARMING MYSELF HAS OCCURRED TO ME: NEVER
I HAVE LOOKED FORWARD WITH ENJOYMENT TO THINGS: AS MUCH AS I EVER DID
THINGS HAVE BEEN GETTING ON TOP OF ME: NO, MOST OF THE TIME I HAVE COPED QUITE WELL
I HAVE FELT SAD OR MISERABLE: NO, NOT AT ALL
I HAVE FELT SCARED OR PANICKY FOR NO GOOD REASON: NO, NOT AT ALL

## 2025-06-25 ASSESSMENT — SOCIAL DETERMINANTS OF HEALTH (SDOH): HOW OFTEN DO YOU GET TOGETHER WITH FRIENDS OR RELATIVES?: ONCE A WEEK

## 2025-06-25 NOTE — PROGRESS NOTES
"Nexplanon Insertion:    Is a pregnancy test required: Yes.  Was it positive or negative?  Negative  Was a consent obtained?  Yes    Subjective: Ashley Cross is a 19 year old  presents for Nexplanon.    Patient has been given the opportunity to ask questions about all forms of birth control, including all options appropriate for Ashley Cross. Discussed that no method of birth control, except abstinence is 100% effective against pregnancy or sexually transmitted infection.     Ashley Cross understands she may have the Nexplanon removed at any time and it should be removed by a health care provider.    The entire insertion procedure was reviewed with the patient, including care after placement.    Patient's last menstrual period was 2024 (exact date). Last sexual activity: before delivery of 6 week old. No allergy to betadine or shellfish.     hCG Urine Qualitative   Date Value Ref Range Status   2025 Negative Negative Final     Comment:     This test is for screening purposes.  Results should be interpreted along with the clinical picture.  Confirmation testing is available if warranted by ordering VBI200, HCG Quantitative Pregnancy.         /84   Pulse 98   Temp 98  F (36.7  C) (Temporal)   Resp 16   Ht 1.45 m (4' 9.09\")   Wt 64 kg (141 lb)   LMP 2024 (Exact Date)   SpO2 98%   Breastfeeding No   BMI 30.42 kg/m      PROCEDURE NOTE: -- Nexplanon Insertion    Reason for Insertion: contraception    Patient was placed supine with right arm exposed.  Delma was made 8-10 cm above medial epicondyle and a guiding delma 4 cm above the first.  Arm was prepped with Betadine. Insertion point was anesthetized with 4 mL 1% lidocaine. After stretching the skin with thumb and index finger around the insertion site, skin punctured with the tip of the needle inserted at 30 degrees and then lowered to horizontal position. The needle was then advanced to its full length. Applicator was then stabilized and " slider was unlocked. Slider was pulled back until it stopped and then removed.    Correct placement of the implant was confirmed by palpation in the patient's arm and visualizing the purple top of the obturator.   Bandage and pressure dressing applied to insertion site.    EBL: minimal    Complications: none    ASSESSMENT:     ICD-10-CM    1. Insertion of implantable subdermal contraceptive  Z30.017 etonogestrel (NEXPLANON) subdermal implant 68 mg     INSERTION NON-BIODEGRADABLE DRUG DELIVERY IMPLANT           PLAN:    Discussed when to have Nexplanon removed, list of danger s/sx, side effects and follow up recommended. Encouraged condom use for prevention of STD. Back up contraception advised for 7 days. Advised to call for any fever, for prolonged or severe pain or bleeding, abnormal vaginal dischage. She was advised to use pain medications (ibuprofen) as needed for mild to moderate pain.     Jose Corea MD

## 2025-06-25 NOTE — PROGRESS NOTES
"Ashley is here for a 6-week postpartum checkup.    She had a c/s for obstetrical complication-arrest of descent- of a viable boy, weight 7 pounds 14 oz., with no other complications.  However, at her two week post operative visit was found to have severe range blood pressures. Was admitted for magnesium and medications were adjusted. She has followed with OB since that time. Placed on nifedipine at the last visit. Since delivery, she has not been breast feeding.  She has no signs of infection, bleeding. She is not pregnant.  We discussed contraceptions and she has chosen nexplanon implant.        EXAM:  Vital signs:  Temp: 98  F (36.7  C) Temp src: Oral BP: 122/84 Pulse: 98   Resp: 16 SpO2: 98 %     Height: 145 cm (4' 9.09\") Weight: 64 kg (141 lb)  Estimated body mass index is 30.42 kg/m  as calculated from the following:    Height as of this encounter: 1.45 m (4' 9.09\").    Weight as of this encounter: 64 kg (141 lb).      BP Readings from Last 6 Encounters:   25 122/84   25 138/88   25 (!) 144/89   25 (!) 132/96   25 114/76   25 122/87       HEENT: grossly normal.  NECK: no lymphadenopathy or thyroidomegaly.  LUNGS: CTA X 2, no rales or crackles.  BACK: No spinal or CVA tenderness.  HEART: RRR without murmurs clicks or gallops.  ABDOMEN: scar intact, dry, abdomen soft, non tender, good bowel sounds, without masses rebound, guarding or tenderness.    PELVIC:  deferred    EXTREMITIES: Warm to touch, good pulses, no ankle edema or calf tenderness.  NEUROLOGIC: grossly normal.    ASSESSMENT:   Normal 6-week postpartum exam after c/s for maternal complications.    PLAN:  1. Routine postpartum follow-up (Primary)  - OB HEMOGLOBIN; Future  - HCG qualitative urine; Future  - HCG qualitative urine  - OB HEMOGLOBIN  2. S/P primary low transverse   3. Chronic hypertension  EHR reviewed.   Past medical history, problem list, past surgical history, family history, social history, " medications reviewed, updated, reconciled.   Csection was uncomplicated. Has hospital visit follow up as well as another admission for pre eclampsia.   Improved now on labetalol 300 mg BID and nifedipine 60 mg daily. She is following with OB for this. Next visit with Dr. Muñiz on 7/11/25.   Reviewed last cbc. Will check hemoglobin today.   She reports pain and bleeding well controlled.   She desires nexplanon insertion today. Will move to Dr. Thornton schedule for placement.

## 2025-07-01 ENCOUNTER — OFFICE VISIT (OUTPATIENT)
Dept: FAMILY MEDICINE | Facility: CLINIC | Age: 20
End: 2025-07-01
Payer: COMMERCIAL

## 2025-07-01 VITALS
TEMPERATURE: 97.3 F | OXYGEN SATURATION: 98 % | HEIGHT: 57 IN | WEIGHT: 141 LBS | RESPIRATION RATE: 16 BRPM | DIASTOLIC BLOOD PRESSURE: 71 MMHG | SYSTOLIC BLOOD PRESSURE: 125 MMHG | BODY MASS INDEX: 30.42 KG/M2 | HEART RATE: 115 BPM

## 2025-07-01 DIAGNOSIS — R00.2 PALPITATIONS: ICD-10-CM

## 2025-07-01 DIAGNOSIS — I10 CHRONIC HYPERTENSION: Primary | ICD-10-CM

## 2025-07-01 PROBLEM — J33.9 NASAL POLYPS: Status: RESOLVED | Noted: 2025-07-01 | Resolved: 2025-07-01

## 2025-07-01 LAB
ERYTHROCYTE [DISTWIDTH] IN BLOOD BY AUTOMATED COUNT: 13.5 % (ref 10–15)
HCT VFR BLD AUTO: 36.6 % (ref 35–47)
HGB BLD-MCNC: 12.1 G/DL (ref 11.7–15.7)
MCH RBC QN AUTO: 25.4 PG (ref 26.5–33)
MCHC RBC AUTO-ENTMCNC: 33.1 G/DL (ref 31.5–36.5)
MCV RBC AUTO: 77 FL (ref 78–100)
PLATELET # BLD AUTO: 399 10E3/UL (ref 150–450)
RBC # BLD AUTO: 4.76 10E6/UL (ref 3.8–5.2)
WBC # BLD AUTO: 10.9 10E3/UL (ref 4–11)

## 2025-07-01 PROCEDURE — 99000 SPECIMEN HANDLING OFFICE-LAB: CPT | Performed by: FAMILY MEDICINE

## 2025-07-01 PROCEDURE — 84244 ASSAY OF RENIN: CPT | Mod: 90 | Performed by: FAMILY MEDICINE

## 2025-07-01 PROCEDURE — 84443 ASSAY THYROID STIM HORMONE: CPT | Performed by: FAMILY MEDICINE

## 2025-07-01 PROCEDURE — 99214 OFFICE O/P EST MOD 30 MIN: CPT | Performed by: FAMILY MEDICINE

## 2025-07-01 PROCEDURE — 80048 BASIC METABOLIC PNL TOTAL CA: CPT | Performed by: FAMILY MEDICINE

## 2025-07-01 PROCEDURE — 36415 COLL VENOUS BLD VENIPUNCTURE: CPT | Performed by: FAMILY MEDICINE

## 2025-07-01 PROCEDURE — 3078F DIAST BP <80 MM HG: CPT | Performed by: FAMILY MEDICINE

## 2025-07-01 PROCEDURE — 3074F SYST BP LT 130 MM HG: CPT | Performed by: FAMILY MEDICINE

## 2025-07-01 PROCEDURE — 85027 COMPLETE CBC AUTOMATED: CPT | Performed by: FAMILY MEDICINE

## 2025-07-01 PROCEDURE — G2211 COMPLEX E/M VISIT ADD ON: HCPCS | Performed by: FAMILY MEDICINE

## 2025-07-01 PROCEDURE — 82088 ASSAY OF ALDOSTERONE: CPT | Performed by: FAMILY MEDICINE

## 2025-07-01 RX ORDER — LABETALOL 300 MG/1
300 TABLET, FILM COATED ORAL 2 TIMES DAILY
Qty: 60 TABLET | Refills: 2 | Status: SHIPPED | OUTPATIENT
Start: 2025-07-01 | End: 2025-09-29

## 2025-07-01 NOTE — PROGRESS NOTES
Prior to immunization administration, verified patients identity using patient s name and date of birth. Please see Immunization Activity for additional information.     Screening Questionnaire for Adult Immunization    Are you sick today?   No   Do you have allergies to medications, food, a vaccine component or latex?   No   Have you ever had a serious reaction after receiving a vaccination?   No   Do you have a long-term health problem with heart, lung, kidney, or metabolic disease (e.g., diabetes), asthma, a blood disorder, no spleen, complement component deficiency, a cochlear implant, or a spinal fluid leak?  Are you on long-term aspirin therapy?   No   Do you have cancer, leukemia, HIV/AIDS, or any other immune system problem?   No   Do you have a parent, brother, or sister with an immune system problem?   No   In the past 3 months, have you taken medications that affect  your immune system, such as prednisone, other steroids, or anticancer drugs; drugs for the treatment of rheumatoid arthritis, Crohn s disease, or psoriasis; or have you had radiation treatments?   No   Have you had a seizure, or a brain or other nervous system problem?   No   During the past year, have you received a transfusion of blood or blood    products, or been given immune (gamma) globulin or antiviral drug?   No   For women: Are you pregnant or is there a chance you could become       pregnant during the next month?   No   Have you received any vaccinations in the past 4 weeks?   No     Immunization questionnaire answers were all negative.      Patient instructed to remain in clinic for 15 minutes afterwards, and to report any adverse reactions.     Screening performed by Varghese Oliver MA on 7/1/2025 at 1:54 PM.

## 2025-07-01 NOTE — PROGRESS NOTES
"  Assessment & Plan     (I10) Chronic hypertension  (primary encounter diagnosis)            BMI  Estimated body mass index is 30.51 kg/m  as calculated from the following:    Height as of this encounter: 1.448 m (4' 9\").    Weight as of this encounter: 64 kg (141 lb).   {Weight Management Plan needed for ACO:211129}      {Follow-up (Optional):569368}    Subjective   Htee is a 19 year old, presenting for the following health issues:  office visit (Heart beating fast when taking bp medications)        7/1/2025     1:49 PM   Additional Questions   Roomed by    Accompanied by self     History of Present Illness       Reason for visit:  Nexplanon insertion   She is taking medications regularly.        {MA/LPN/RN Pre-Provider Visit Orders- hCG/UA/Strep (Optional):929969}  {SUPERLIST (Optional):471744}  {additonal problems for provider to add (Optional):261592}    {ROS Picklists (Optional):029794}      Objective    /71 (BP Location: Left arm, Patient Position: Sitting, Cuff Size: Adult Regular)   Pulse 115   Temp 97.3  F (36.3  C) (Temporal)   Resp 16   Ht 1.448 m (4' 9\")   Wt 64 kg (141 lb)   LMP 08/01/2024 (Exact Date)   SpO2 98%   BMI 30.51 kg/m    Body mass index is 30.51 kg/m .  Physical Exam   {Exam List (Optional):014940}    {Diagnostic Test Results (Optional):106589}        Signed Electronically by: Linda Maldonado MD  {Email feedback regarding this note to [email or web address]   :146835}  " " SECTION N/A 2025    Procedure:  SECTION;  Surgeon: Radha Muñiz MD;  Location: [facility] OR     Family History   Problem Relation Age of Onset    Depression Mother     Anxiety Disorder Mother     Gestational Diabetes Mother          Hypertension Follow-up    Do you check your blood pressure regularly outside of the clinic? Yes   Are you following a low salt diet? Yes  Are your blood pressures ever more than 140 on the top number (systolic) OR more   than 90 on the bottom number (diastolic), for example 140/90? Yes    BP Readings from Last 2 Encounters:   25 (!) 145/94   25 125/71           Objective    /71 (BP Location: Left arm, Patient Position: Sitting, Cuff Size: Adult Regular)   Pulse 115   Temp 97.3  F (36.3  C) (Temporal)   Resp 16   Ht 1.448 m (4' 9\")   Wt 64 kg (141 lb)   LMP 2024 (Exact Date)   SpO2 98%   BMI 30.51 kg/m    Body mass index is 30.51 kg/m .  Physical Exam   GENERAL: alert and no distress  NECK: no adenopathy, no asymmetry, masses, or scars  RESP: lungs clear to auscultation - no rales, rhonchi or wheezes  CV: regular rate and rhythm, normal S1 S2, no S3 or S4, no murmur, click or rub, no peripheral edema  NEURO: Normal strength and tone, mentation intact and speech normal  PSYCH: mentation appears normal, affect normal/bright    Results for orders placed or performed in visit on 25   TSH with free T4 reflex     Status: Normal   Result Value Ref Range    TSH 0.98 0.50 - 4.30 uIU/mL   Basic metabolic panel  (Ca, Cl, CO2, Creat, Gluc, K, Na, BUN)     Status: Abnormal   Result Value Ref Range    Sodium 138 135 - 145 mmol/L    Potassium 3.7 3.4 - 5.3 mmol/L    Chloride 105 98 - 107 mmol/L    Carbon Dioxide (CO2) 21 (L) 22 - 29 mmol/L    Anion Gap 12 7 - 15 mmol/L    Urea Nitrogen 10.7 6.0 - 20.0 mg/dL    Creatinine 0.77 0.51 - 0.95 mg/dL    GFR Estimate >90 >60 mL/min/1.73m2    Calcium 9.6 8.8 - 10.4 mg/dL    Glucose 86 70 - 99 " mg/dL   CBC with platelets     Status: Abnormal   Result Value Ref Range    WBC Count 10.9 4.0 - 11.0 10e3/uL    RBC Count 4.76 3.80 - 5.20 10e6/uL    Hemoglobin 12.1 11.7 - 15.7 g/dL    Hematocrit 36.6 35.0 - 47.0 %    MCV 77 (L) 78 - 100 fL    MCH 25.4 (L) 26.5 - 33.0 pg    MCHC 33.1 31.5 - 36.5 g/dL    RDW 13.5 10.0 - 15.0 %    Platelet Count 399 150 - 450 10e3/uL   Aldosterone     Status: Normal   Result Value Ref Range    Aldosterone 4.0 0.0 - 31.0 ng/dL   Renin activity     Status: None   Result Value Ref Range    Renin Activity 1.1 ng/mL/hr   Aldosterone Renin Ratio     Status: Normal   Result Value Ref Range    Aldosterone Renin Ratio 3.6 0.0 - 25.0   Aldosterone Renin Ratio     Status: None    Narrative    The following orders were created for panel order Aldosterone Renin Ratio.  Procedure                               Abnormality         Status                     ---------                               -----------         ------                     Aldosterone[[id number]]                 Normal              Final result               Renin activity[[id number]]                                  Final result               Aldosterone Renin Ratio[[id number]]     Normal              Final result                 Please view results for these tests on the individual orders.           Signed Electronically by: Linda Maldonado MD

## 2025-07-02 LAB
ANION GAP SERPL CALCULATED.3IONS-SCNC: 12 MMOL/L (ref 7–15)
BUN SERPL-MCNC: 10.7 MG/DL (ref 6–20)
CALCIUM SERPL-MCNC: 9.6 MG/DL (ref 8.8–10.4)
CHLORIDE SERPL-SCNC: 105 MMOL/L (ref 98–107)
CREAT SERPL-MCNC: 0.77 MG/DL (ref 0.51–0.95)
EGFRCR SERPLBLD CKD-EPI 2021: >90 ML/MIN/1.73M2
GLUCOSE SERPL-MCNC: 86 MG/DL (ref 70–99)
HCO3 SERPL-SCNC: 21 MMOL/L (ref 22–29)
POTASSIUM SERPL-SCNC: 3.7 MMOL/L (ref 3.4–5.3)
SODIUM SERPL-SCNC: 138 MMOL/L (ref 135–145)
TSH SERPL DL<=0.005 MIU/L-ACNC: 0.98 UIU/ML (ref 0.5–4.3)

## 2025-07-03 LAB
ALDOST SERPL-MCNC: 4 NG/DL (ref 0–31)
ALDOST/RENIN PLAS-RTO: 3.6 {RATIO} (ref 0–25)
RENIN PLAS-CCNC: 1.1 NG/ML/HR

## 2025-07-08 ENCOUNTER — MEDICAL CORRESPONDENCE (OUTPATIENT)
Dept: HEALTH INFORMATION MANAGEMENT | Facility: CLINIC | Age: 20
End: 2025-07-08
Payer: COMMERCIAL

## 2025-07-17 ENCOUNTER — OFFICE VISIT (OUTPATIENT)
Dept: FAMILY MEDICINE | Facility: CLINIC | Age: 20
End: 2025-07-17
Payer: COMMERCIAL

## 2025-07-17 VITALS
WEIGHT: 135 LBS | TEMPERATURE: 97.4 F | SYSTOLIC BLOOD PRESSURE: 145 MMHG | DIASTOLIC BLOOD PRESSURE: 94 MMHG | BODY MASS INDEX: 29.12 KG/M2 | HEART RATE: 115 BPM | RESPIRATION RATE: 16 BRPM | OXYGEN SATURATION: 98 % | HEIGHT: 57 IN

## 2025-07-17 DIAGNOSIS — I10 CHRONIC HYPERTENSION: ICD-10-CM

## 2025-07-17 DIAGNOSIS — R00.0 TACHYCARDIA: Primary | ICD-10-CM

## 2025-07-17 DIAGNOSIS — Z30.46 ENCOUNTER FOR NEXPLANON REMOVAL: ICD-10-CM

## 2025-07-17 DIAGNOSIS — Z30.011 OCP (ORAL CONTRACEPTIVE PILLS) INITIATION: ICD-10-CM

## 2025-07-17 DIAGNOSIS — Z34.01 ENCOUNTER FOR SUPERVISION OF NORMAL FIRST PREGNANCY IN FIRST TRIMESTER: ICD-10-CM

## 2025-07-17 LAB
ATRIAL RATE - MUSE: 87 BPM
DIASTOLIC BLOOD PRESSURE - MUSE: NORMAL MMHG
INTERPRETATION ECG - MUSE: NORMAL
P AXIS - MUSE: 44 DEGREES
PR INTERVAL - MUSE: 126 MS
QRS DURATION - MUSE: 80 MS
QT - MUSE: 378 MS
QTC - MUSE: 454 MS
R AXIS - MUSE: 48 DEGREES
SYSTOLIC BLOOD PRESSURE - MUSE: NORMAL MMHG
T AXIS - MUSE: 20 DEGREES
VENTRICULAR RATE- MUSE: 87 BPM

## 2025-07-17 RX ORDER — PRENATAL VIT/IRON FUM/FOLIC AC 27MG-0.8MG
1 TABLET ORAL DAILY
Qty: 90 TABLET | Refills: 3 | Status: SHIPPED | OUTPATIENT
Start: 2025-07-17

## 2025-07-17 RX ORDER — ACETAMINOPHEN AND CODEINE PHOSPHATE 120; 12 MG/5ML; MG/5ML
0.35 SOLUTION ORAL DAILY
Qty: 84 TABLET | Refills: 1 | Status: SHIPPED | OUTPATIENT
Start: 2025-07-17

## 2025-07-17 NOTE — PROGRESS NOTES
"  {PROVIDER CHARTING PREFERENCE:769510}    Subjective   Htee is a 19 year old, presenting for the following health issues:  nexplanon removal , heart (Heart burning ), shaking , and med  (Birth control pills )      7/17/2025     8:57 AM   Additional Questions   Roomed by irene cadena   Accompanied by self and friend     History of Present Illness       Reason for visit:  Nexplanon removal   She is taking medications regularly.        {MA/LPN/RN Pre-Provider Visit Orders- hCG/UA/Strep (Optional):847557}  {SUPERLIST (Optional):782641}  {additonal problems for provider to add (Optional):074261}    {ROS Picklists (Optional):443389}      Objective    BP (!) 145/94 (BP Location: Left arm, Patient Position: Sitting, Cuff Size: Adult Large)   Pulse 115   Temp 97.4  F (36.3  C) (Temporal)   Resp 16   Ht 1.45 m (4' 9.09\")   Wt 61.2 kg (135 lb)   LMP 07/17/2025 (Exact Date)   SpO2 98%   Breastfeeding Yes   BMI 29.12 kg/m    Body mass index is 29.12 kg/m .  Physical Exam   {Exam List (Optional):954127}    {Diagnostic Test Results (Optional):723888}        Signed Electronically by: Jose Corea MD  {Email feedback regarding this note to [email or web address]   :874722}  "

## 2025-07-17 NOTE — PROGRESS NOTES
Prior to immunization administration, verified patients identity using patient s name and date of birth. Please see Immunization Activity for additional information.     Screening Questionnaire for Adult Immunization    Are you sick today?   No   Do you have allergies to medications, food, a vaccine component or latex?   No   Have you ever had a serious reaction after receiving a vaccination?   No   Do you have a long-term health problem with heart, lung, kidney, or metabolic disease (e.g., diabetes), asthma, a blood disorder, no spleen, complement component deficiency, a cochlear implant, or a spinal fluid leak?  Are you on long-term aspirin therapy?   No   Do you have cancer, leukemia, HIV/AIDS, or any other immune system problem?   No   Do you have a parent, brother, or sister with an immune system problem?   No   In the past 3 months, have you taken medications that affect  your immune system, such as prednisone, other steroids, or anticancer drugs; drugs for the treatment of rheumatoid arthritis, Crohn s disease, or psoriasis; or have you had radiation treatments?   No   Have you had a seizure, or a brain or other nervous system problem?   No   During the past year, have you received a transfusion of blood or blood    products, or been given immune (gamma) globulin or antiviral drug?   No   For women: Are you pregnant or is there a chance you could become       pregnant during the next month?   No   Have you received any vaccinations in the past 4 weeks?   No     Immunization questionnaire answers were all negative.      Patient instructed to remain in clinic for 15 minutes afterwards, and to report any adverse reactions.     Screening performed by Marcos Watkins MA on 7/17/2025 at 9:01 AM.

## 2025-07-17 NOTE — PROGRESS NOTES
Nexplanon Removal:     Is a pregnancy test required: No.  Was a consent obtained?  Yes    Ashley Cross is here for removal of etonogestrel implant Nexplanon/Implanon    Indication: SIDE EFFECTS       Preoperative Diagnosis: etonogestrel implant  Postoperative Diagnosis: etonogestrel implant removed    Technique: On the right arm  Skin prep Betadine  Anesthesia 1% lidocaine, with epi  Procedure: Small incision (<5mm) was made at distal end of palpable implant, curved hemostat or mosquito forceps was used to isolate the implant and bring it to the incision, the fibrous capsule containing the implant  was incised and the Implant was removed intact.    EBL: minimal  Complications:  No  Tolerance:  Pt tolerated procedure well and was in stable condition.   Dressing:    A pressure bandage was placed for the next 12-24 hours.    Contraception was discussed and patient chose the following method mini pill / progesterone only pill      Follow up: Pt was instructed to call if bleeding, severe pain or foul smell.     Jose Corea MD

## 2025-07-21 ENCOUNTER — DOCUMENTATION ONLY (OUTPATIENT)
Dept: ALLERGY | Facility: CLINIC | Age: 20
End: 2025-07-21

## 2025-07-21 NOTE — PROGRESS NOTES
7/21 if patient call to reschedule, she need to follow up with Dr. Nickerson for Dupixent. Patient was NO SHOW today 7/21. She was last seen 7/18/2024.

## 2025-07-25 ENCOUNTER — OFFICE VISIT (OUTPATIENT)
Dept: FAMILY MEDICINE | Facility: CLINIC | Age: 20
End: 2025-07-25
Payer: COMMERCIAL

## 2025-07-25 VITALS
BODY MASS INDEX: 29.56 KG/M2 | HEIGHT: 57 IN | DIASTOLIC BLOOD PRESSURE: 82 MMHG | SYSTOLIC BLOOD PRESSURE: 136 MMHG | HEART RATE: 138 BPM | TEMPERATURE: 97.8 F | WEIGHT: 137 LBS | OXYGEN SATURATION: 99 % | RESPIRATION RATE: 20 BRPM

## 2025-07-25 DIAGNOSIS — R00.0 TACHYCARDIA: ICD-10-CM

## 2025-07-25 DIAGNOSIS — I10 CHRONIC HYPERTENSION: Primary | ICD-10-CM

## 2025-07-25 PROCEDURE — G2211 COMPLEX E/M VISIT ADD ON: HCPCS | Performed by: FAMILY MEDICINE

## 2025-07-25 PROCEDURE — 99214 OFFICE O/P EST MOD 30 MIN: CPT | Performed by: FAMILY MEDICINE

## 2025-07-25 PROCEDURE — 3079F DIAST BP 80-89 MM HG: CPT | Performed by: FAMILY MEDICINE

## 2025-07-25 PROCEDURE — 3075F SYST BP GE 130 - 139MM HG: CPT | Performed by: FAMILY MEDICINE

## 2025-07-25 NOTE — PROGRESS NOTES
Prior to immunization administration, verified patients identity using patient s name and date of birth. Please see Immunization Activity for additional information.     Screening Questionnaire for Adult Immunization    Are you sick today?   No   Do you have allergies to medications, food, a vaccine component or latex?   No   Have you ever had a serious reaction after receiving a vaccination?   No   Do you have a long-term health problem with heart, lung, kidney, or metabolic disease (e.g., diabetes), asthma, a blood disorder, no spleen, complement component deficiency, a cochlear implant, or a spinal fluid leak?  Are you on long-term aspirin therapy?   No   Do you have cancer, leukemia, HIV/AIDS, or any other immune system problem?   No   Do you have a parent, brother, or sister with an immune system problem?   No   In the past 3 months, have you taken medications that affect  your immune system, such as prednisone, other steroids, or anticancer drugs; drugs for the treatment of rheumatoid arthritis, Crohn s disease, or psoriasis; or have you had radiation treatments?   No   Have you had a seizure, or a brain or other nervous system problem?   No   During the past year, have you received a transfusion of blood or blood    products, or been given immune (gamma) globulin or antiviral drug?   No   For women: Are you pregnant or is there a chance you could become       pregnant during the next month?   No   Have you received any vaccinations in the past 4 weeks?   No     Immunization questionnaire answers were all negative.      Patient instructed to remain in clinic for 15 minutes afterwards, and to report any adverse reactions.     Screening performed by Jodie Newsome CMA on 7/25/2025 at 12:13 PM.        Answers submitted by the patient for this visit:  General Questionnaire (Submitted on 7/25/2025)  Chief Complaint: Chronic problems general questions HPI Form  What is the reason for your visit today? : check  blood pressure  How many days per week do you miss taking your medication?: 0  Questionnaire about: Chronic problems general questions HPI Form (Submitted on 7/25/2025)  Chief Complaint: Chronic problems general questions HPI Form

## 2025-07-25 NOTE — PROGRESS NOTES
Assessment & Plan     (I10) Chronic hypertension  (primary encounter diagnosis)  (R00.0) Tachycardia  EHR reviewed.   Past medical history, problem list, past surgical history, family history, social history, medications reviewed, updated, reconciled.   Was seen last week by Dr. Corea. Nexplanon was removed. She was evaluated for hypertension and tachycardia.   Her blood pressure is fairly well controlled. Encouraged to be compliant with beta blocker. Continue to hold nifedipine. Undergoing work up with Dr. Corea. EKG was normal. Today's pulse was quite high on rooming, it decreased with time. The etiology is unclear. Consider EP or event monitor. Thyroid testing has been normal. We reviewed reasons to call or be seen urgently. Recommended follow up in two to three weeks to determine if still tachycardic and next steps.       Subjective   Htee is a 19 year old, presenting for the following health issues:  Follow Up      7/25/2025    12:09 PM   Additional Questions   Roomed by Tia   Accompanied by Sister     History of Present Illness       Reason for visit:  Check blood pressure   She is taking medications regularly.        Hypertension Follow-up    Do you check your blood pressure regularly outside of the clinic? Yes   Are you following a low salt diet? Yes  Are your blood pressures ever more than 140 on the top number (systolic) OR more   than 90 on the bottom number (diastolic), for example 140/90? No    BP Readings from Last 2 Encounters:   07/25/25 136/82   07/17/25 (!) 145/94       [age] year old female with history of hypertension, recent delivery, nasal polyps and chronic sinusitis here for follow up.   She wanted to check up on her blood pressure.   She stopped nifedipine. Is taking labetalol but only just got the refill. She denies side affects to this but did not like taking nifedipine, she says it made her heart race and felt tired. Today her heart rate is elevated as well. No recent nifedipine  "use. She says she is really nervous about her blood pressure. She denies chest pain, trouble breathing, leg swelling, vision changes.       Past Medical History:   Diagnosis Date    Delayed delivery after SROM (spontaneous rupture of membranes) 2025    Head lice 10/13/2015    Hypertension     Nasal polyps 2025    Varicella      Patient Active Problem List   Diagnosis    Chronic sinusitis    Nasal polyposis    Supervision of normal first pregnancy, antepartum    Chronic hypertension    Fetal renal anomaly, single gestation    Encounter for triage in pregnant patient    Supervision of high risk pregnancy, antepartum    Chronic hypertension affecting pregnancy    Delayed delivery after SROM (spontaneous rupture of membranes)    S/P primary low transverse     ABLA (acute blood loss anemia)    Pre-eclampsia    Preeclampsia, severe     Past Surgical History:   Procedure Laterality Date     SECTION N/A 2025    Procedure:  SECTION;  Surgeon: Radha Muñiz MD;  Location: [facility]     Family History   Problem Relation Age of Onset    Depression Mother     Anxiety Disorder Mother     Gestational Diabetes Mother              Objective    /82 (BP Location: Right arm, Patient Position: Sitting, Cuff Size: Adult Regular)   Pulse (!) 179   Temp 97.8  F (36.6  C) (Temporal)   Resp 20   Ht 1.448 m (4' 9\")   Wt 62.1 kg (137 lb)   LMP 2025 (Exact Date)   SpO2 99%   Breastfeeding Yes   BMI 29.65 kg/m    Body mass index is 29.65 kg/m .  Physical Exam   BP Readings from Last 6 Encounters:   25 136/82   25 (!) 145/94   25 125/71   25 122/84   25 122/84   25 138/88       Wt Readings from Last 3 Encounters:   25 62.1 kg (137 lb) (65%, Z= 0.38)*   25 61.2 kg (135 lb) (62%, Z= 0.30)*   25 64 kg (141 lb) (71%, Z= 0.54)*     * Growth percentiles are based on [facility] (Girls, 2-20 Years) data.       GENERAL: alert and no " distress  EYES: Eyes grossly normal to inspection, PERRL and conjunctivae and sclerae normal  NECK: no adenopathy, no asymmetry, masses, or scars  RESP: lungs clear to auscultation - no rales, rhonchi or wheezes  CV: tachycardia, normal S1 S2, no S3 or S4, no murmur, click or rub, peripheral pulses strong, and no peripheral edema  MS: no gross musculoskeletal defects noted, no edema  NEURO: Normal strength and tone, mentation intact and speech normal  PSYCH: mentation appears normal and anxious    No results found for any visits on 07/25/25.        Signed Electronically by: Linda Maldonado MD

## 2025-07-31 ENCOUNTER — PATIENT OUTREACH (OUTPATIENT)
Dept: CARE COORDINATION | Facility: CLINIC | Age: 20
End: 2025-07-31
Payer: COMMERCIAL

## 2025-09-01 ENCOUNTER — PATIENT OUTREACH (OUTPATIENT)
Dept: CARE COORDINATION | Facility: CLINIC | Age: 20
End: 2025-09-01
Payer: COMMERCIAL

## (undated) DEVICE — GLOVE BIOGEL PI ULTRATOUCH G SZ 6.5 42165

## (undated) DEVICE — SUCTION TIP YANKAUER W/O VENT K86

## (undated) DEVICE — ESU LIGASURE OPEN SEALER/DIVIDER SM JAW 16.5MM LF1212A

## (undated) DEVICE — SYR 10ML FINGER CONTROL W/O NDL 309695

## (undated) DEVICE — SUTURE VICRYL+ 0 36IN CT-1 UND VCP946H

## (undated) DEVICE — DRESSING MEPILEX ADH 4INX10IN STRL LF 496455

## (undated) DEVICE — SURGICEL POWDER ABSORBABLE HEMOSTAT 3GM 3013SP

## (undated) DEVICE — NEEDLE HYPO MAGELLAN SAFETY 22GA 1 1/2IN 8881850215

## (undated) DEVICE — ESU GROUND PAD ADULT REM W/15' CORD E7507DB

## (undated) DEVICE — GLOVE UNDER INDICATOR PI SZ 6.5 LF 41665

## (undated) DEVICE — SOL WATER IRRIG 1000ML BOTTLE 2F7114

## (undated) DEVICE — CUSTOM PACK C-SECTION LHE

## (undated) DEVICE — SU VICRYL 3-0 CT-1 27" UND J258H

## (undated) DEVICE — SUCTION MANIFOLD NEPTUNE 2 SYS 1 PORT 702-025-000

## (undated) DEVICE — SUTURE MONOCRYL+ 4-0 PS-2 27IN MCP426H

## (undated) DEVICE — PACK MINOR SINGLE BASIN SSK3001

## (undated) DEVICE — GOWN LG DISP 9515

## (undated) DEVICE — DRSG STERI STRIP 1/2X4" R1547

## (undated) DEVICE — PREP CHLORAPREP 26ML TINTED HI-LITE ORANGE 930815

## (undated) RX ORDER — ONDANSETRON 2 MG/ML
INJECTION INTRAMUSCULAR; INTRAVENOUS
Status: DISPENSED
Start: 2025-04-30

## (undated) RX ORDER — FENTANYL CITRATE-0.9 % NACL/PF 10 MCG/ML
PLASTIC BAG, INJECTION (ML) INTRAVENOUS
Status: DISPENSED
Start: 2025-04-30

## (undated) RX ORDER — KETOROLAC TROMETHAMINE 30 MG/ML
INJECTION, SOLUTION INTRAMUSCULAR; INTRAVENOUS
Status: DISPENSED
Start: 2025-04-30

## (undated) RX ORDER — MORPHINE SULFATE 1 MG/ML
INJECTION, SOLUTION EPIDURAL; INTRATHECAL; INTRAVENOUS
Status: DISPENSED
Start: 2025-04-30